# Patient Record
Sex: MALE | Race: WHITE
[De-identification: names, ages, dates, MRNs, and addresses within clinical notes are randomized per-mention and may not be internally consistent; named-entity substitution may affect disease eponyms.]

---

## 2018-04-24 ENCOUNTER — HOSPITAL ENCOUNTER (OUTPATIENT)
Dept: HOSPITAL 47 - RADXRMAIN | Age: 73
Discharge: HOME | End: 2018-04-24
Payer: MEDICARE

## 2018-04-24 DIAGNOSIS — R52: Primary | ICD-10-CM

## 2018-04-24 PROCEDURE — 71046 X-RAY EXAM CHEST 2 VIEWS: CPT

## 2018-04-24 PROCEDURE — 72040 X-RAY EXAM NECK SPINE 2-3 VW: CPT

## 2018-04-24 PROCEDURE — 72100 X-RAY EXAM L-S SPINE 2/3 VWS: CPT

## 2018-04-24 NOTE — XR
EXAMINATION TYPE: XR chest 2V

 

DATE OF EXAM: 4/24/2018

 

COMPARISON: NONE

 

HISTORY: Shortness of breath

 

TECHNIQUE:  Frontal and lateral views of the chest are obtained.

 

FINDINGS:

 

Scattered senescent parenchymal changes noted. Hyperinflation compatible with COPD. 

 

No evidence for infiltrate. No evidence for atelectasis.

 

Heart size is stable.

 

Mediastinal structures are stable and grossly unremarkable.

 

No evidence for hilar prominence.

 

Degenerative changes dorsal spine. 

 

IMPRESSION:

1. No evidence for acute pulmonary disease.

## 2018-04-24 NOTE — XR
EXAMINATION TYPE: XR lumbar spine 2 or 3V

 

DATE OF EXAM: 4/24/2018

 

CLINICAL HISTORY: pain

 

TECHNIQUE: Three views of the lumbar spine are submitted.

 

COMPARISON: None.

 

FINDINGS:  

There are 5 lumbar type vertebral bodies identified.  The lumbar spine shows satisfactory alignment w
ithout evidence of acute fracture or dislocation. Vertebral body heights are within normal limits.   
Moderate to severe degenerative disc space narrowing at L4-5 and L5-S1. Facet joint arthropathy.  The
 overlying soft tissue appears unremarkable.

 

IMPRESSION:  

No acute fracture or dislocation is seen in the lumbar spine.

 

ICD 10 NO FRACTURE, INITIAL EVALUATION

## 2018-04-24 NOTE — XR
EXAMINATION TYPE: XR cervical spine limited

 

DATE OF EXAM: 4/24/2018

 

CLINICAL HISTORY: pain

 

TECHNIQUE: 3 views of the cervical spine are submitted.

 

COMPARISON: None.

 

FINDINGS:  There is satisfactory in alignment without evidence of acute fracture or dislocation.  The
 pre-vertebral soft tissue appears within normal limits. Moderate degenerative narrowing at C5-6 and 
C6-7. Ventral and dorsal spondylosis. The C1-C2 articulation is unremarkable on the open mouth view. 


 

 

IMPRESSION:  No acute fracture or dislocation is seen in the cervical spine.

## 2018-04-25 ENCOUNTER — HOSPITAL ENCOUNTER (OUTPATIENT)
Dept: HOSPITAL 47 - RADUSWWP | Age: 73
Discharge: HOME | End: 2018-04-25
Payer: MEDICARE

## 2018-04-25 DIAGNOSIS — N64.4: Primary | ICD-10-CM

## 2018-04-25 NOTE — USB
Reason for exam: clinical finding.

Indicated problem(s): pain in the left breast.



Physical Findings:

Nurse did not find any significant physical abnormalities on exam.



US Breast LT

Left breast ultrasound includes all four quadrants, the retroareolar region and 

axilla. Finding demonstrates no cystic or solid lesion seen.



These results were verbally communicated with the patient and result sheet given 

to the patient on 4/25/18.





ASSESSMENT: Benign, BI-RAD 2



RECOMMENDATION:

Clinical management of the left breast.

Manage patient on a clinical basis.

## 2019-02-16 ENCOUNTER — HOSPITAL ENCOUNTER (OUTPATIENT)
Dept: HOSPITAL 47 - EC | Age: 74
Setting detail: OBSERVATION
LOS: 3 days | Discharge: HOME | End: 2019-02-19
Payer: MEDICARE

## 2019-02-16 VITALS — BODY MASS INDEX: 29.5 KG/M2

## 2019-02-16 DIAGNOSIS — Z79.899: ICD-10-CM

## 2019-02-16 DIAGNOSIS — I25.110: Primary | ICD-10-CM

## 2019-02-16 DIAGNOSIS — Z95.1: ICD-10-CM

## 2019-02-16 DIAGNOSIS — Z79.4: ICD-10-CM

## 2019-02-16 DIAGNOSIS — R77.8: ICD-10-CM

## 2019-02-16 DIAGNOSIS — E78.5: ICD-10-CM

## 2019-02-16 DIAGNOSIS — Z95.5: ICD-10-CM

## 2019-02-16 DIAGNOSIS — I67.4: ICD-10-CM

## 2019-02-16 DIAGNOSIS — Z79.82: ICD-10-CM

## 2019-02-16 DIAGNOSIS — I10: ICD-10-CM

## 2019-02-16 DIAGNOSIS — E11.9: ICD-10-CM

## 2019-02-16 DIAGNOSIS — Z79.02: ICD-10-CM

## 2019-02-16 DIAGNOSIS — I25.82: ICD-10-CM

## 2019-02-16 DIAGNOSIS — Z87.891: ICD-10-CM

## 2019-02-16 LAB
ALBUMIN SERPL-MCNC: 4 G/DL (ref 3.5–5)
ALP SERPL-CCNC: 115 U/L (ref 38–126)
ALT SERPL-CCNC: 29 U/L (ref 21–72)
ANION GAP SERPL CALC-SCNC: 8 MMOL/L
APTT BLD: 23.9 SEC (ref 22–30)
AST SERPL-CCNC: 26 U/L (ref 17–59)
BASOPHILS # BLD AUTO: 0.1 K/UL (ref 0–0.2)
BASOPHILS NFR BLD AUTO: 1 %
BUN SERPL-SCNC: 21 MG/DL (ref 9–20)
CALCIUM SPEC-MCNC: 9.3 MG/DL (ref 8.4–10.2)
CHLORIDE SERPL-SCNC: 105 MMOL/L (ref 98–107)
CHOLEST SERPL-MCNC: 253 MG/DL (ref ?–200)
CK SERPL-CCNC: 59 U/L (ref 55–170)
CK SERPL-CCNC: 62 U/L (ref 55–170)
CK SERPL-CCNC: 82 U/L (ref 55–170)
CO2 SERPL-SCNC: 27 MMOL/L (ref 22–30)
EOSINOPHIL # BLD AUTO: 0.2 K/UL (ref 0–0.7)
EOSINOPHIL NFR BLD AUTO: 2 %
ERYTHROCYTE [DISTWIDTH] IN BLOOD BY AUTOMATED COUNT: 5.52 M/UL (ref 4.3–5.9)
ERYTHROCYTE [DISTWIDTH] IN BLOOD: 13.8 % (ref 11.5–15.5)
GLUCOSE BLD-MCNC: 120 MG/DL (ref 75–99)
GLUCOSE BLD-MCNC: 125 MG/DL (ref 75–99)
GLUCOSE BLD-MCNC: 360 MG/DL (ref 75–99)
GLUCOSE BLD-MCNC: 432 MG/DL (ref 75–99)
GLUCOSE SERPL-MCNC: 150 MG/DL (ref 74–99)
HCT VFR BLD AUTO: 46.9 % (ref 39–53)
HDLC SERPL-MCNC: 27 MG/DL (ref 40–60)
HGB BLD-MCNC: 15.4 GM/DL (ref 13–17.5)
INR PPP: 0.9 (ref ?–1.2)
LYMPHOCYTES # SPEC AUTO: 1.8 K/UL (ref 1–4.8)
LYMPHOCYTES NFR SPEC AUTO: 16 %
MAGNESIUM SPEC-SCNC: 2.1 MG/DL (ref 1.6–2.3)
MCH RBC QN AUTO: 27.8 PG (ref 25–35)
MCHC RBC AUTO-ENTMCNC: 32.7 G/DL (ref 31–37)
MCV RBC AUTO: 85 FL (ref 80–100)
MONOCYTES # BLD AUTO: 0.8 K/UL (ref 0–1)
MONOCYTES NFR BLD AUTO: 7 %
NEUTROPHILS # BLD AUTO: 8.1 K/UL (ref 1.3–7.7)
NEUTROPHILS NFR BLD AUTO: 72 %
PLATELET # BLD AUTO: 288 K/UL (ref 150–450)
POTASSIUM SERPL-SCNC: 3.7 MMOL/L (ref 3.5–5.1)
PROT SERPL-MCNC: 7.1 G/DL (ref 6.3–8.2)
PT BLD: 9.5 SEC (ref 9–12)
SODIUM SERPL-SCNC: 140 MMOL/L (ref 137–145)
TRIGL SERPL-MCNC: 445 MG/DL (ref ?–150)
TROPONIN I SERPL-MCNC: 0.02 NG/ML (ref 0–0.03)
TROPONIN I SERPL-MCNC: 0.03 NG/ML (ref 0–0.03)
TROPONIN I SERPL-MCNC: 0.04 NG/ML (ref 0–0.03)
WBC # BLD AUTO: 11.3 K/UL (ref 3.8–10.6)

## 2019-02-16 PROCEDURE — 75710 ARTERY X-RAYS ARM/LEG: CPT

## 2019-02-16 PROCEDURE — 99285 EMERGENCY DEPT VISIT HI MDM: CPT

## 2019-02-16 PROCEDURE — 93005 ELECTROCARDIOGRAM TRACING: CPT

## 2019-02-16 PROCEDURE — 83880 ASSAY OF NATRIURETIC PEPTIDE: CPT

## 2019-02-16 PROCEDURE — 82553 CREATINE MB FRACTION: CPT

## 2019-02-16 PROCEDURE — 94760 N-INVAS EAR/PLS OXIMETRY 1: CPT

## 2019-02-16 PROCEDURE — 85730 THROMBOPLASTIN TIME PARTIAL: CPT

## 2019-02-16 PROCEDURE — 80053 COMPREHEN METABOLIC PANEL: CPT

## 2019-02-16 PROCEDURE — 85610 PROTHROMBIN TIME: CPT

## 2019-02-16 PROCEDURE — 80061 LIPID PANEL: CPT

## 2019-02-16 PROCEDURE — 93459 L HRT ART/GRFT ANGIO: CPT

## 2019-02-16 PROCEDURE — 82550 ASSAY OF CK (CPK): CPT

## 2019-02-16 PROCEDURE — 83735 ASSAY OF MAGNESIUM: CPT

## 2019-02-16 PROCEDURE — 85379 FIBRIN DEGRADATION QUANT: CPT

## 2019-02-16 PROCEDURE — 84484 ASSAY OF TROPONIN QUANT: CPT

## 2019-02-16 PROCEDURE — 93306 TTE W/DOPPLER COMPLETE: CPT

## 2019-02-16 PROCEDURE — 85025 COMPLETE CBC W/AUTO DIFF WBC: CPT

## 2019-02-16 RX ADMIN — INSULIN ASPART SCH UNIT: 100 INJECTION, SOLUTION INTRAVENOUS; SUBCUTANEOUS at 21:07

## 2019-02-16 RX ADMIN — INSULIN ASPART SCH: 100 INJECTION, SOLUTION INTRAVENOUS; SUBCUTANEOUS at 12:21

## 2019-02-16 RX ADMIN — CARVEDILOL SCH MG: 12.5 TABLET, FILM COATED ORAL at 11:53

## 2019-02-16 RX ADMIN — INSULIN ASPART SCH UNIT: 100 INJECTION, SOLUTION INTRAVENOUS; SUBCUTANEOUS at 16:38

## 2019-02-16 RX ADMIN — INSULIN ASPART SCH: 100 INJECTION, SOLUTION INTRAVENOUS; SUBCUTANEOUS at 11:47

## 2019-02-16 RX ADMIN — CLOPIDOGREL BISULFATE SCH MG: 75 TABLET ORAL at 11:53

## 2019-02-16 RX ADMIN — HYDROCHLOROTHIAZIDE SCH MG: 12.5 CAPSULE ORAL at 12:20

## 2019-02-16 RX ADMIN — EZETIMIBE SCH MG: 10 TABLET ORAL at 12:20

## 2019-02-16 RX ADMIN — LOSARTAN POTASSIUM SCH MG: 50 TABLET, FILM COATED ORAL at 11:53

## 2019-02-16 RX ADMIN — CARVEDILOL SCH MG: 12.5 TABLET, FILM COATED ORAL at 16:38

## 2019-02-16 RX ADMIN — ATORVASTATIN CALCIUM SCH MG: 20 TABLET, FILM COATED ORAL at 11:53

## 2019-02-16 NOTE — ECHOF
Referral Reason:cp



MEASUREMENTS

--------

HEIGHT: 175.3 cm

WEIGHT: 90.7 kg

BP: 149/64

IVSd:   1.2 cm     (0.6 - 1.1)

LVIDd:   5.3 cm     (3.9 - 5.3)

LVPWd:   1.1 cm     (0.6 - 1.1)

EDV(Teich):   135 ml

IVSs:   1.5 cm

LVIDs:   3.5 cm

LVPWs:   1.5 cm

ESV(Teich):   50 ml

EF(Teich):   63 %

%FS:   35 %

SV(Teich):   86 ml

RVIDd:   3.3 cm     (< 3.3)

LALs A4C:   6.3 cm

LAAs A4C:   21.1 cm

LAESV A-L A4C:   60 ml

LAESV MOD A4C:   59 ml

LALs A2C:   5.3 cm

LAAs A2C:   20.0 cm

LAESV A-L A2C:   64 ml

LAESV MOD A2C:   63 ml

LAESV(A-L):   68 ml

LAESV Index (A-L):   32.85 ml/m

Ao Diam:   3.8 cm     (2.0 - 3.7)

LA Diam:   3.4 cm     (2.7 - 3.8)

AV Cusp:   1.7 cm     (1.5 - 2.6)

EPSS:   0.4 cm

MV E Eyad:   0.50 m/s

MV DecT:   410 ms

MV Dec Hardee:   1.2 m/s

MV A Eyad:   0.64 m/s

MV E/A Ratio:   0.79 

AV Vmax:   1.29 m/s

AV maxP.67 mmHg

TR Vmax:   2.07 m/s

TR maxP.17 mmHg

RAP:   5.00 mmHg

RVSP:   22.17 mmHg

MV EF SLOPE:   115.99 mm/s     (70 - 150)

MV EXCURSION:   1.99 cm     (> 18.000)







FINDINGS

--------

Sinus rhythm.

This was a technically adequate study.

The left ventricular size is normal.   There is mild concentric left ventricular hypertrophy.   Overa
ll left ventricular systolic function is normal with, an EF between 55 - 60 %.

The right ventricle is normal in size.

LA is midly dilated 29-33ml/m2.

The right atrium is normal in size.

Aortic valve is trileaflet and is mildly thickened.   There is no evidence of aortic regurgitation.  
 There is no evidence of aortic stenosis.

The mitral valve leaflets are mildly thickened.   There is trace to mild mitral regurgitation.

Trace tricuspid regurgitation present.   Right ventricular systolic pressure is normal at < 35 mmHg. 
  There is no evidence of pulmonary hypertension.

The pulmonic valve was not well visualized.

The aortic root size is normal.

Normal inferior vena cava with normal inspiratory collapse consistent with estimated right atrial pre
ssure of  5 mmHg.

There is no pericardial effusion.



CONCLUSIONS

--------

1. Sinus rhythm.

2. This was a technically adequate study.

3. The left ventricular size is normal.

4. There is mild concentric left ventricular hypertrophy.

5. Overall left ventricular systolic function is normal with, an EF between 55 - 60 %.

6. The right ventricle is normal in size.

7. LA is midly dilated 29-33ml/m2.

8. Aortic valve is trileaflet and is mildly thickened.

9. The mitral valve leaflets are mildly thickened.

10. There is trace to mild mitral regurgitation.

11. Trace tricuspid regurgitation present.

12. Right ventricular systolic pressure is normal at < 35 mmHg.

13. There is no evidence of pulmonary hypertension.

14. The pulmonic valve was not well visualized.

15. The aortic root size is normal.

16. There is no pericardial effusion.





SONOGRAPHER: Atul Mcrae RDCS

## 2019-02-16 NOTE — ED
Chest Pain HPI





- General


Stated Complaint: Hypertension


Time Seen by Provider: 02/16/19 03:16


Source: patient, EMS


Mode of arrival: EMS


Limitations: no limitations





- History of Present Illness


Initial Comments: 


Abimbola is a pleasant 73 yo male with PMH of CAD and CABG who presents to our ED 

as a transfer via EMS from an outside facility.  Patient reports that earlier 

today he began having a mild headache and feeling some heaviness in his chest E 

noted his blood pressure is elevated at which time he went to the emergency 

department for evaluation.  At the outside facility patient had a head CT with 

no acute findings, was given medications in the blood pressure normalized his 

EKG was nonischemic and his first troponin was negative.  Given the patient's 

significant history it was decided that he should be transferred to our system 

facility for evaluation by cardiology.


Patient reports that his chest heaviness resolved at the outside facility and 

is not been having any heaviness since that time.  He denies any associated 

shortness of breath diaphoresis or lightheadedness.  He does admit to having a 

mild headache earlier in the day but reports that has resolved as well.








- Related Data


 Home Medications











 Medication  Instructions  Recorded  Confirmed


 


Aspirin [Adult Low Dose Aspirin EC] 162 mg PO HS 10/14/16 10/17/16


 


Carvedilol [Coreg] 12.5 mg PO BID 10/14/16 10/14/16


 


Clopidogrel [Plavix] 75 mg PO DAILY 10/14/16 10/17/16


 


Ezetimibe/Simvastatin [Vytorin 1 tab PO QAM 10/14/16 10/14/16





10-40 mg Tablet]   


 


Insulin Glargine [Lantus] 55 unit SQ QAM 10/14/16 10/17/16


 


Niacin [Niacin ER] 2,000 mg PO HS 10/14/16 10/14/16


 


Telmisartan/Hydrochlorothiazid 1 tab PO QAM 10/14/16 10/14/16





[Telmisartan-Hctz 80-12.5 mg Tb]   


 


amLODIPine [Norvasc] 10 mg PO QAM 10/14/16 10/14/16











 Allergies











Allergy/AdvReac Type Severity Reaction Status Date / Time


 


No Known Allergies Allergy   Verified 10/14/16 12:50














Review of Systems


ROS Statement: 


Those systems with pertinent positive or pertinent negative responses have been 

documented in the HPI.





ROS Other: All systems not noted in ROS Statement are negative.





EKG Findings





- EKG Comments:


EKG Findings:: EKG obtained at 3:21 AM, rate is 73 rhythm is sinus there is a 

leftward axis normal intervals, , , QTc 469.  There is an 

incomplete right bundle branch block.  There are no acute ST elevations or 

depressions there is no evidence of acute ischemia or infarction.





Past Medical History


Past Medical History: Diabetes Mellitus, Hyperlipidemia, Hypertension


History of Any Multi-Drug Resistant Organisms: None Reported


Past Surgical History: Coronary Bypass/CABG, Heart Catheterization With Stent


Additional Past Surgical History / Comment(s): CABG, 2004.  CATARACTS.  

BILATERAL EYE SURGERY (DIABETIC)


Past Anesthesia/Blood Transfusion Reactions: No Reported Reaction


Date of Last Stent Placement:: UNK


Past Psychological History: No Psychological Hx Reported


Smoking Status: Former smoker


Past Alcohol Use History: None Reported


Past Drug Use History: None Reported





General Exam





- General Exam Comments


Initial Comments: 


Physical Exam


GENERAL:


Patient is well-developed and well-nourished.  Patient is nontoxic and well-

hydrated and is in no distress.





HENT:


Normocephalic, Atraumatic. 





EYES:


PERRL, EOMI





PULMONARY:


Unlabored respirations.  No audible rales rhonchi or wheezing was noted.





CARDIOVASCULAR:


There is a regular rate and rhythm without any murmurs gallops or rubs.  


Well healed mid sternal incision consistent with history of CABG





ABDOMEN:


Soft and nontender with normal bowel sounds. 





SKIN:


Skin is clear with no lesions or rashes and otherwise unremarkable.





: 


Deferred





NEUROLOGIC:


Patient is alert and oriented x3.  Moving all extremities spontaneously





MUSCULOSKELETAL:


Normal extremities with adequate strength and full range of motion.  No lower 

extremity swelling or edema.  No calf tenderness.  





PSYCHIATRIC:


Normal psychiatric evaluation.  





Limitations: no limitations





Limitations: no limitations





Course


 Vital Signs











  02/16/19 02/16/19 02/16/19





  03:16 03:18 03:53


 


Temperature 98.1 F  


 


Pulse Rate 75  76


 


Pulse Rate [  71 





Cardiac Monitor   





]   


 


Respiratory 18  18





Rate   


 


Blood Pressure 145/88  152/87


 


O2 Sat by Pulse 97  97





Oximetry   














Chest Pain MDM





- [facility]


Patient care was discussed with transferring physician patient had a thorough 

cardiac workup at outside facility as well as a head CT


Upon arrival patient is asymptomatic, blood pressure is elevated but not 

critically so


Repeat labs were ordered





Patient's PCP was paged for admission


Patient admitted with consult to cardiology








Disposition


Clinical Impression: 


 Chest pain





Disposition: ADMITTED AS IP TO THIS HOSP


Condition: Stable


Is patient prescribed a controlled substance at d/c from ED?: No

## 2019-02-16 NOTE — CONS
CONSULTATION



Mr. Downey is a 74-year-old gentleman who is seen for the cardiac evaluation.  The

patient's electronic medical records as well as the old charts are reviewed.  The

patient has a known history of coronary artery disease with coronary artery bypass

surgery.  He had a cardiac catheterization done in 2016 which showed a patent graft.

The patient has not been followed with Dr. May regularly.  This patient gives a

history that he started having a headache and then he had heaviness in the chest.  He

went to [facility] where his blood pressure was in the range of 200.  He was

given medications and the blood pressure stabilized.  EKG did not show any acute

ischemic changes, but because of his history, patient was transferred over here.  He is

feeling fairly well.  The patient was moderately active physically and he is not having

any exertional chest discomfort, usually.



PAST MEDICAL HISTORY:

Past medical history includes a history of coronary artery bypass surgery in 2004,

cardiac catheterization done in 2016.  The patient has a history of hypertension,

hyperlipidemia, and diabetes.



MEDICATIONS:

Home medications include amlodipine 10 mg daily.  Micardis, niacin, insulin, Vytorin,

Coreg and Plavix and baby aspirin.



PHYSICAL EXAMINATION:

Physical examination in the emergency room here, patient's blood pressure was 152/87

mmHg, oxygen saturation was 97%.  HEENT examination is negative.  Neck is supple.

There is no increase in jugular venous pressure.  Both the carotid pulses are felt.

There is no bruit.  Chest is symmetrical.  Heart the PMI is not felt.  First and second

heart sounds are normal.  There is no evidence of any murmur.  Lungs are clinically

clear to auscultation and percussion.  Abdomen is soft.  Liver and spleen are not

enlarged.  Bowel sounds are heard.  Extremities:  Peripheral pulses are 2+.



LABORATORY DATA:

EKG shows right bundle branch block with left anterior hemiblock versus old inferior

wall myocardial infarction.  The patient's initial troponin at the [facility]

was normal.  First troponin done here is 0.029.  The patient's creatinine is 1.10.



FINAL IMPRESSION:

Hypertension and headache, rule out non ST-segment elevation myocardial infarction.

Patient's 1st troponin done here is abnormal.  We will add and do serial  troponin, if

the troponins are suggestive of non ST-segment elevation myocardial infarction, patient

will need further evaluation with a cardiac catheterization



Thank you for the consultation.





MMODL / IJN: [id number] / Job#: [id number]

## 2019-02-17 LAB
GLUCOSE BLD-MCNC: 252 MG/DL (ref 75–99)
GLUCOSE BLD-MCNC: 254 MG/DL (ref 75–99)
GLUCOSE BLD-MCNC: 303 MG/DL (ref 75–99)
GLUCOSE BLD-MCNC: 504 MG/DL (ref 75–99)

## 2019-02-17 RX ADMIN — ASPIRIN 325 MG ORAL TABLET SCH MG: 325 PILL ORAL at 08:29

## 2019-02-17 RX ADMIN — INSULIN ASPART SCH UNIT: 100 INJECTION, SOLUTION INTRAVENOUS; SUBCUTANEOUS at 06:23

## 2019-02-17 RX ADMIN — EZETIMIBE SCH MG: 10 TABLET ORAL at 08:29

## 2019-02-17 RX ADMIN — CARVEDILOL SCH MG: 12.5 TABLET, FILM COATED ORAL at 06:23

## 2019-02-17 RX ADMIN — INSULIN ASPART SCH UNIT: 100 INJECTION, SOLUTION INTRAVENOUS; SUBCUTANEOUS at 16:58

## 2019-02-17 RX ADMIN — INSULIN DETEMIR SCH UNIT: 100 INJECTION, SOLUTION SUBCUTANEOUS at 11:49

## 2019-02-17 RX ADMIN — CLOPIDOGREL BISULFATE SCH MG: 75 TABLET ORAL at 08:29

## 2019-02-17 RX ADMIN — INSULIN ASPART SCH: 100 INJECTION, SOLUTION INTRAVENOUS; SUBCUTANEOUS at 11:16

## 2019-02-17 RX ADMIN — LOSARTAN POTASSIUM SCH MG: 50 TABLET, FILM COATED ORAL at 10:09

## 2019-02-17 RX ADMIN — HYDROCHLOROTHIAZIDE SCH MG: 12.5 CAPSULE ORAL at 10:09

## 2019-02-17 RX ADMIN — CARVEDILOL SCH MG: 12.5 TABLET, FILM COATED ORAL at 16:58

## 2019-02-17 RX ADMIN — INSULIN ASPART SCH UNIT: 100 INJECTION, SOLUTION INTRAVENOUS; SUBCUTANEOUS at 20:16

## 2019-02-17 RX ADMIN — ATORVASTATIN CALCIUM SCH MG: 20 TABLET, FILM COATED ORAL at 08:29

## 2019-02-17 NOTE — PN
PROGRESS NOTE



SUBJECTIVE:

This is a white male who came in with elevated troponin and chest pain.  He is much

better.  His blood pressure is under better control.  Sugar is up to 500 today. He has

not been on his insulin in a few days. One out of three troponins was high. Dr. May

apparently is going to do a heart catheterization on him as he has a history of heart

disease. Diabetes will continue to be controlled with current medicines.



CARDIOVASCULAR: S1, S2.

LUNGS: Transmitted upper sounds.

GI: Soft.

HEMATOLOGY: Negative Homans.



_____45, cholesterol 253, HDL 27.



ASSESSMENT:

1. Unstable angina.

2. Elevated troponin.

3. Hypertension acceleration, doubt myocardial infarction after normal echo.

4. Diabetes.

5. Dyslipidemia.

Cardiac catheterization and possible discharge home. Insulin and blood pressure

medicines have been ordered.





MMODL / IJN: [id number] / Job#: [id number]

## 2019-02-17 NOTE — HP
HISTORY AND PHYSICAL



CHIEF COMPLAINT:

74-year-old white male came to the hospital with some chest pain.



HISTORY OF PRESENT ILLNESS:

74-year-old white male who stopped taking blood pressure medicines 3 days ago for

unclear reasons, possibly that he is depressed per nursing.  Anyway he came to hospital

due to chest pain and heaviness in his chest.  He was found to have a second troponin

elevated.  Wait for Cardiology and echo reports.  Chest x-ray is negative.



PAST MEDICAL HISTORY:

Hypertension acceleration has been treated with appropriate medications.



MEDICATIONS:

Home medicines.  Please see orders.



PAST SURGICAL HISTORY:

Surgical history see old chart.



ALLERGIES:

Allergies see old chart.



REVIEW OF SYSTEMS:

Fourteen point review of systems negative except for mentioned in HPI.



PHYSICAL EXAMINATION:

VITAL SIGNS:  Reviewed.

Cardiovascular S1, S2.

LUNGS:  Clear.  GI soft.  Hematology negative Homans.  Psych fair mood and affect.

NEUROLOGIC:  Alert and orient x3. Ophthalmological pupils equal, round, react to light

and accommodation.



ASSESSMENT:

1. Elevated troponin.

2. Chest tightness.

3. Suspect angina.

4. Hypertension acceleration.

5. Possible depression.

6. Rule out myocardial infarction.

Continue current treatment.  Cardiology consult.  Echo.  Hypertension control.





MMODL / IJN: [id number] / Job#: [id number]

## 2019-02-17 NOTE — P.PN
Subjective


Progress Note Date: 02/17/19


This is a 74-year-old gentleman seen in consultation yesterday by Dr. VC Gramajo.  He had a cardiac catheterization performed in 2016 which revealed a 

patent graft, he gives history that he's been having intermittent chest 

heaviness.  His EKG did not reveal any ischemic change but because of his 

history he was transferred here.  Patient does have history of hypertension, 

hyperlipidemia, diabetes.  He was noted to have mild abnormality in his 

troponins.  For this reason patient was advised to undergo cardiac 

catheterization, the risks and benefits were explained to the patient and his 

wife in detail, this will be performed tomorrow by Dr. Bird.  Blood pressure 

128/60 with a heart rate in the 50s, 97% on room air.








Objective





- Vital Signs


Vital signs: 


 Vital Signs











Temp  97.1 F L  02/17/19 08:00


 


Pulse  56 L  02/17/19 12:00


 


Resp  18   02/17/19 12:00


 


BP  129/69   02/17/19 12:00


 


Pulse Ox  97   02/17/19 12:00








 Intake & Output











 02/16/19 02/17/19 02/17/19





 18:59 06:59 18:59


 


Intake Total 240  480


 


Balance 240  480


 


Intake:   


 


  Oral 240  480


 


Other:   


 


  Voiding Method Toilet Toilet 





  Urinal 


 


  # Voids  1 














- Exam





PHYSICAL EXAMINATION: 





GENERAL: 74-year-old  gentleman in no acute distress at the time of my 

examination





HEENT: Head is atraumatic, normocephalic.  Pupils equal, round.  Sclera 

anicteric. Conjunctiva are clear.  Mucous membranes of the mouth are moist.  

Neck is supple.  There is no elevated jugular venous pressure.]  bruit is heard.





HEART EXAMINATION: Heart S1, S2 normal.  No murmur or gallop heard.





CHEST EXAMINATION: Lungs are clear to auscultation and precussion. No chest 

wall tenderness is noted on palpation or with deep breathing.





ABDOMEN:  Soft, nontender. Bowel sounds are heard. No organomegaly noted.


 


EXTREMITIES: 2+ peripheral pulses with no evidence of peripheral edema and no 

calf tenderness noted.





NEUROLOGIC patient is awake, alert and oriented 3 .


 


.


 








- Labs


CBC & Chem 7: 


 02/16/19 03:30





 02/16/19 03:30


Labs: 


 Abnormal Lab Results - Last 24 Hours (Table)











  02/16/19 02/16/19 02/16/19 Range/Units





  03:30 15:59 16:14 


 


D-Dimer   0.76 H   (<0.60)  mg/L FEU


 


POC Glucose (mg/dL)    432 H  (75-99)  mg/dL


 


Triglycerides  445 H    (<150)  mg/dL


 


Cholesterol  253 H    (<200)  mg/dL


 


HDL Cholesterol  27 L    (40-60)  mg/dL














  02/16/19 02/17/19 02/17/19 Range/Units





  20:27 05:51 11:09 


 


D-Dimer     (<0.60)  mg/L FEU


 


POC Glucose (mg/dL)  360 H  303 H  504 H  (75-99)  mg/dL


 


Triglycerides     (<150)  mg/dL


 


Cholesterol     (<200)  mg/dL


 


HDL Cholesterol     (40-60)  mg/dL














Assessment and Plan


Plan: 





Assessment and plan


#1 hypertension


#2 possible non-Q-wave myocardial infarction.  Patient will be scheduled to 

undergo cardiac catheterization tomorrow with Dr. Bird.


#3 known history of coronary artery disease with prior bypass surgery


#4 hypertension


#5 diabetes


#6 hyperlipidemia





Plan


Cardiac catheterization tomorrow with Dr. Bird.  Further recommendations will 

be based on these findings and patient's clinical course.





DNP note has been reviewed, I agree with a documented findings and plan of 

care.  Patient was seen and examined.

## 2019-02-18 LAB
GLUCOSE BLD-MCNC: 236 MG/DL (ref 75–99)
GLUCOSE BLD-MCNC: 237 MG/DL (ref 75–99)
GLUCOSE BLD-MCNC: 310 MG/DL (ref 75–99)
GLUCOSE BLD-MCNC: 393 MG/DL (ref 75–99)

## 2019-02-18 PROCEDURE — B2151ZZ FLUOROSCOPY OF LEFT HEART USING LOW OSMOLAR CONTRAST: ICD-10-PCS

## 2019-02-18 PROCEDURE — B41C1ZZ FLUOROSCOPY OF PELVIC ARTERIES USING LOW OSMOLAR CONTRAST: ICD-10-PCS

## 2019-02-18 PROCEDURE — B2111ZZ FLUOROSCOPY OF MULTIPLE CORONARY ARTERIES USING LOW OSMOLAR CONTRAST: ICD-10-PCS

## 2019-02-18 PROCEDURE — 4A023N7 MEASUREMENT OF CARDIAC SAMPLING AND PRESSURE, LEFT HEART, PERCUTANEOUS APPROACH: ICD-10-PCS

## 2019-02-18 PROCEDURE — B2131ZZ FLUOROSCOPY OF MULTIPLE CORONARY ARTERY BYPASS GRAFTS USING LOW OSMOLAR CONTRAST: ICD-10-PCS

## 2019-02-18 RX ADMIN — CARVEDILOL SCH MG: 12.5 TABLET, FILM COATED ORAL at 06:35

## 2019-02-18 RX ADMIN — CARVEDILOL SCH MG: 12.5 TABLET, FILM COATED ORAL at 17:34

## 2019-02-18 RX ADMIN — INSULIN ASPART SCH UNIT: 100 INJECTION, SOLUTION INTRAVENOUS; SUBCUTANEOUS at 12:09

## 2019-02-18 RX ADMIN — LOSARTAN POTASSIUM SCH MG: 50 TABLET, FILM COATED ORAL at 06:36

## 2019-02-18 RX ADMIN — INSULIN DETEMIR SCH UNIT: 100 INJECTION, SOLUTION SUBCUTANEOUS at 09:23

## 2019-02-18 RX ADMIN — ASPIRIN 325 MG ORAL TABLET SCH: 325 PILL ORAL at 05:22

## 2019-02-18 RX ADMIN — CLOPIDOGREL BISULFATE SCH MG: 75 TABLET ORAL at 06:36

## 2019-02-18 RX ADMIN — HYDROCHLOROTHIAZIDE SCH MG: 12.5 CAPSULE ORAL at 09:23

## 2019-02-18 RX ADMIN — EZETIMIBE SCH MG: 10 TABLET ORAL at 06:36

## 2019-02-18 RX ADMIN — INSULIN ASPART SCH UNIT: 100 INJECTION, SOLUTION INTRAVENOUS; SUBCUTANEOUS at 21:09

## 2019-02-18 RX ADMIN — INSULIN ASPART SCH UNIT: 100 INJECTION, SOLUTION INTRAVENOUS; SUBCUTANEOUS at 06:35

## 2019-02-18 RX ADMIN — ATORVASTATIN CALCIUM SCH: 20 TABLET, FILM COATED ORAL at 05:22

## 2019-02-18 RX ADMIN — INSULIN ASPART SCH UNIT: 100 INJECTION, SOLUTION INTRAVENOUS; SUBCUTANEOUS at 17:25

## 2019-02-18 NOTE — CC
CARDIAC CATHETERIZATION REPORT



DATE OF SERVICE:

February 18, 2019



PERFORMING PHYSICIAN:

Selvin May MD, interventional cardiologist.



PROCEDURE PERFORMED:

1. Selective left and right coronary angiogram.

2. LIMA to LAD angiogram.

3. SVG to diagonal angiogram.

4. SVG to OM angiogram.

5. SVG to RCA angiogram.

6. Left heart catheterization.

7. Selective right common iliac artery angiogram.

8. Gradient measurement across the right common iliac artery.



INDICATION:

This is a pleasant 74-year-old gentleman who has known history of coronary artery

disease with the last heart catheterization in 2016 showing severe triple-vessel

coronary artery disease with patent LIMA to LAD, patent SVG to diagonal, patent SVG to

OM, and patent SVG to RCA.  He presented to the hospital complaining of chest

discomfort and was seen by Dr. Gramajo who recommended proceeding with a heart

catheterization giving his symptoms of chest discomfort, which was quite concerning for

angina.



APPROACH:

Right common femoral artery.



COMPLICATION:

None.



LEVEL OF SEDATION:

Moderate with sedation length of 28 minutes.



PROCEDURE DESCRIPTION:

After obtaining an informed consent, the patient was brought to the cardiac cath lab.

The right common femoral artery was cannulated using micropuncture technique, the

micropuncture wire passed easily then I placed a 6-Surinamese sheath 11 cm in the right

common iliac artery.  I did have hard time advancing J-wire across the right common

iliac artery by its ostium and I was able to cross it using a Glidewire 0.035.  After

that, I did exchange my 11 cm 6-Surinamese sheath into 23 cm 6-Surinamese sheath using 0.035

Glidewire because I had a hard time advancing JL4 across the ostial of the right common

iliac artery.



After that I did selective left and right coronary angiogram using JL4 and JR4

catheters.  LIMA to LAD angiogram, SVG to diagonal angiogram, SVG to RCA angiogram, and

SVG to OM angiogram, were performed using the JR4 catheter.  Left heart catheterization

was performed using JR4 catheter which flipped into the LV then I did pullback across

aortic valve.



Finally I did selective right common iliac artery angiogram and gradient measurement

across the right common iliac artery _____by the end of the procedure.



SELECTIVE CORONARY ANGIOGRAM:

1. The left main appeared to be heavily calcified with critical disease distally.  It

    bifurcates into left circumflex and left anterior descending artery.

2. The left circumflex is a large caliber vessel.  It is a nondominant vessel.  The

    left circumflex gives rise into a large OM in the midportion, which seems to be

    subtotally occluded.  The left circumflex continues after that as a small-caliber

    vessel in the AV groove.

3. The LAD is 100% occluded by its ostium.

4. The RCA has a critical disease in the mid and distal portion.



CORONARY BYPASS ANGIOGRAM:

1. The LIMA to LAD is patent.  The LAD distal to the LIMA anastomosis has a lesion

    appeared to be in the range of 50%.

2. The SVG to diagonal is patent.

3. SVG to OM is patent.

4. SVG to RCA is patent.



HEMODYNAMICS:

The left ventricular end-diastolic pressure was around 8 mmHg without significant

gradient across the aortic valve.



SELECTIVE ILIAC ANGIOGRAM:

The selective right iliac angiogram was performed in the BURNETTE projection and using a

power injection.  The ostial of the right common iliac artery has a lesion appeared to

be in the range of 99%.  The gradient was 57 mmHg.



CONCLUSION:

1. Severe triple-vessel coronary artery disease.

2. Patent LIMA to LAD.  The LAD distal to the LIMA anastomosis has 50% disease.

3. Patent SVG to diagonal.

4. Patent SVG to OM.

5. Patent SVG to RCA.

6. 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



POSTPROCEDURE MANAGEMENT:

1. At this point maximize medical treatment.

2. PTA of the right common iliac artery if the patient does have symptoms of

    intermittent claudication.





MMODL / IJN: [id number] / Job#: [id number]

## 2019-02-18 NOTE — LTR
February 18, 2019







Re: Abimbola Downey





Dear Dr. Rg:



Mr. Abimbola Downey underwent a heart catheterization and that revealed severe triple-

vessel coronary artery disease, but patent bypasses.



I did recommend maximized medical treatment and follow up with the patient.



Thank you for allowing us to participate in his care and please do not hesitate to call

if you have any question or concern.



Sincerely,





MD ABDULAZIZ Gonzalez / RADHA: [id number] / Job#: [id number]

## 2019-02-18 NOTE — PN
PROGRESS NOTE



This patient is a white male who was admitted to the hospital for catheterization which

showed normal stents, normal bypass grafts without any significant obstruction to the

patent bypass grafts. There was some question if he had some obstruction in his right

leg. He is cleared for discharge for medical management of his triple-vessel disease

due to patent anastomoses. He possibly has critical disease of the right ostium of the

right common iliac artery. If he gets symptoms as an outpatient, he might get a

thrombectomy of the right leg down the road if he has any intermittent claudication.

Otherwise we will continue to monitor him. Discharge home in the morning.





MMODL / IJN: [id number] / Job#: [id number]

## 2019-02-19 VITALS
TEMPERATURE: 98.2 F | SYSTOLIC BLOOD PRESSURE: 144 MMHG | HEART RATE: 58 BPM | DIASTOLIC BLOOD PRESSURE: 68 MMHG | RESPIRATION RATE: 14 BRPM

## 2019-02-19 LAB — GLUCOSE BLD-MCNC: 95 MG/DL (ref 75–99)

## 2019-02-19 RX ADMIN — LOSARTAN POTASSIUM SCH MG: 50 TABLET, FILM COATED ORAL at 08:05

## 2019-02-19 RX ADMIN — INSULIN DETEMIR SCH UNIT: 100 INJECTION, SOLUTION SUBCUTANEOUS at 08:32

## 2019-02-19 RX ADMIN — CLOPIDOGREL BISULFATE SCH MG: 75 TABLET ORAL at 08:06

## 2019-02-19 RX ADMIN — ASPIRIN 325 MG ORAL TABLET SCH MG: 325 PILL ORAL at 08:05

## 2019-02-19 RX ADMIN — EZETIMIBE SCH MG: 10 TABLET ORAL at 08:05

## 2019-02-19 RX ADMIN — INSULIN ASPART SCH: 100 INJECTION, SOLUTION INTRAVENOUS; SUBCUTANEOUS at 06:31

## 2019-02-19 RX ADMIN — ATORVASTATIN CALCIUM SCH MG: 20 TABLET, FILM COATED ORAL at 08:05

## 2019-02-19 RX ADMIN — CARVEDILOL SCH MG: 12.5 TABLET, FILM COATED ORAL at 06:37

## 2019-02-19 RX ADMIN — HYDROCHLOROTHIAZIDE SCH MG: 12.5 CAPSULE ORAL at 08:06

## 2019-04-05 ENCOUNTER — HOSPITAL ENCOUNTER (OUTPATIENT)
Dept: HOSPITAL 47 - CATHCVL | Age: 74
LOS: 1 days | Discharge: HOME | End: 2019-04-06
Attending: INTERNAL MEDICINE
Payer: MEDICARE

## 2019-04-05 VITALS — BODY MASS INDEX: 26.6 KG/M2

## 2019-04-05 VITALS — RESPIRATION RATE: 18 BRPM

## 2019-04-05 DIAGNOSIS — E11.9: ICD-10-CM

## 2019-04-05 DIAGNOSIS — Z79.82: ICD-10-CM

## 2019-04-05 DIAGNOSIS — I70.0: ICD-10-CM

## 2019-04-05 DIAGNOSIS — Z79.4: ICD-10-CM

## 2019-04-05 DIAGNOSIS — I70.8: Primary | ICD-10-CM

## 2019-04-05 DIAGNOSIS — I08.1: ICD-10-CM

## 2019-04-05 DIAGNOSIS — I73.9: ICD-10-CM

## 2019-04-05 DIAGNOSIS — I10: ICD-10-CM

## 2019-04-05 DIAGNOSIS — Z95.1: ICD-10-CM

## 2019-04-05 DIAGNOSIS — E78.5: ICD-10-CM

## 2019-04-05 DIAGNOSIS — F17.210: ICD-10-CM

## 2019-04-05 DIAGNOSIS — Z79.02: ICD-10-CM

## 2019-04-05 DIAGNOSIS — I45.10: ICD-10-CM

## 2019-04-05 DIAGNOSIS — Z79.899: ICD-10-CM

## 2019-04-05 DIAGNOSIS — I25.10: ICD-10-CM

## 2019-04-05 DIAGNOSIS — I25.2: ICD-10-CM

## 2019-04-05 LAB
ANION GAP SERPL CALC-SCNC: 5 MMOL/L
BASOPHILS # BLD AUTO: 0.1 K/UL (ref 0–0.2)
BASOPHILS NFR BLD AUTO: 0 %
BUN SERPL-SCNC: 15 MG/DL (ref 9–20)
CALCIUM SPEC-MCNC: 9 MG/DL (ref 8.4–10.2)
CHLORIDE SERPL-SCNC: 110 MMOL/L (ref 98–107)
CO2 SERPL-SCNC: 25 MMOL/L (ref 22–30)
EOSINOPHIL # BLD AUTO: 0.2 K/UL (ref 0–0.7)
EOSINOPHIL NFR BLD AUTO: 2 %
ERYTHROCYTE [DISTWIDTH] IN BLOOD BY AUTOMATED COUNT: 4.94 M/UL (ref 4.3–5.9)
ERYTHROCYTE [DISTWIDTH] IN BLOOD: 15.1 % (ref 11.5–15.5)
GLUCOSE BLD-MCNC: 147 MG/DL (ref 75–99)
GLUCOSE BLD-MCNC: 209 MG/DL (ref 75–99)
GLUCOSE BLD-MCNC: 78 MG/DL (ref 75–99)
GLUCOSE SERPL-MCNC: 77 MG/DL (ref 74–99)
HCT VFR BLD AUTO: 40.7 % (ref 39–53)
HGB BLD-MCNC: 13.5 GM/DL (ref 13–17.5)
LYMPHOCYTES # SPEC AUTO: 1.2 K/UL (ref 1–4.8)
LYMPHOCYTES NFR SPEC AUTO: 9 %
MCH RBC QN AUTO: 27.3 PG (ref 25–35)
MCHC RBC AUTO-ENTMCNC: 33.2 G/DL (ref 31–37)
MCV RBC AUTO: 82.2 FL (ref 80–100)
MONOCYTES # BLD AUTO: 1.1 K/UL (ref 0–1)
MONOCYTES NFR BLD AUTO: 8 %
NEUTROPHILS # BLD AUTO: 10.5 K/UL (ref 1.3–7.7)
NEUTROPHILS NFR BLD AUTO: 78 %
PLATELET # BLD AUTO: 276 K/UL (ref 150–450)
POTASSIUM SERPL-SCNC: 4.6 MMOL/L (ref 3.5–5.1)
SODIUM SERPL-SCNC: 140 MMOL/L (ref 137–145)
WBC # BLD AUTO: 13.3 K/UL (ref 3.8–10.6)

## 2019-04-05 PROCEDURE — 85025 COMPLETE CBC W/AUTO DIFF WBC: CPT

## 2019-04-05 PROCEDURE — 75625 CONTRAST EXAM ABDOMINL AORTA: CPT

## 2019-04-05 PROCEDURE — 37221: CPT

## 2019-04-05 PROCEDURE — 80048 BASIC METABOLIC PNL TOTAL CA: CPT

## 2019-04-05 PROCEDURE — 75716 ARTERY X-RAYS ARMS/LEGS: CPT

## 2019-04-05 RX ADMIN — FENTANYL CITRATE ONE MCG: 50 INJECTION, SOLUTION INTRAMUSCULAR; INTRAVENOUS at 09:21

## 2019-04-05 RX ADMIN — FENTANYL CITRATE ONE MCG: 50 INJECTION, SOLUTION INTRAMUSCULAR; INTRAVENOUS at 08:47

## 2019-04-05 RX ADMIN — CARVEDILOL SCH MG: 12.5 TABLET, FILM COATED ORAL at 17:49

## 2019-04-05 NOTE — AN
ANGIOGRAPHY REPORT



DATE OF SERVICE:

04/05/2019



PERFORMING PHYSICIAN:

Selvin May MD, Interventional Cardiologist.



PROCEDURE PERFORMED:

1. Selective right common iliac artery angiogram.

2. Balloon angioplasty of the right common iliac artery using 7 x 30 mm balloon.

3. Successful stenting of the right common iliac artery using a 9 x 30 mm balloon

    expandable stent with an excellent angiographic result and reduction of stenosis

    from 99% to 0%.

4. An abdominal aortogram.

5. Bilateral lower extremities runoff.



INDICATION:

This is a 74-year-old gentleman who was experiencing symptoms of right leg intermittent

claudication.  He underwent a heart catheterization a few weeks ago for acute non ST

elevation myocardial infarction and he was found to have a tight lesion involving the

right iliac artery.  Because of that, he was brought today to undergo a PTA of the

right common iliac artery as well as an abdominal aortogram and bilateral lower

extremities runoff.



APPROACH:

Right common femoral artery.



COMPLICATION:

None



LEVEL OF SEDATION:

Moderate with sedation length of 40 minutes.



PROCEDURE DESCRIPTION:

After obtaining an informed consent, the patient was brought to the cardiac cath lab.

The right common femoral artery was cannulated using micropuncture technique, the

micropuncture wire passed easily, then I placed a 7-I[place]ic 23 cm Brite tip sheath in

the right common iliac artery.  I did selective right common iliac artery angiogram and

then I did an abdominal aortogram and bilateral lower extremities runoff using 5-Icelandic

pigtail catheter which was initially placed at the level of the renal arteries and it

was pulled into above the bifurcation of the aorta to right and left common iliac

arteries.



After that, I did an angioplasty of the right common iliac artery.  Please see a

separate paragraph for that.



SELECTIVE PERIPHERAL ANGIOGRAM:

1. The abdominal aorta appeared to have mild disease only and seems to be calcified.

2. COMMON ILIAC ARTERY:  The right common iliac artery is calcified with a critical

    lesion.  The left common iliac artery appeared to be angiographically normal.

3. INTERNAL ILIAC ARTERIES:  The right and left internal iliac arteries are patent.

4. EXTERNAL ILIAC ARTERIES:  The right and left externals are angiographically normal.

5. COMMON FEMORAL ARTERIES:  The right and left common femoral arteries are

    angiographically normal.

6. PROFUNDA:  The right and left profunda are patent.

7. SFA:  The right and left SFA are patent.

8. POPLITEAL:  The right and left popliteal are patent.

9. BELOW THE KNEE:  There are 3 vessel runoff below the knee bilaterally.



PTA OF THE RIGHT ILIAC ARTERY:

Anticoagulation was initiated using heparin and the patient was given 10,000 units of

heparin IV.  Subsequently, I did cross the critical lesion in the right common iliac

artery using a 0.35 [place] [employer] wire.  I did balloon angioplasty using 7 mm balloon

before I deployed 9 x 27 mm balloon expandable stent where the stent was positioned

under fluoroscopy guidance and deployed under 12 atmospheres for 20 seconds with the

following angiogram showing excellent angiographic results and reduction of stenosis

from 99% to 0%.



CONCLUSION:

1. Successful stenting critical lesion in the right common iliac artery with an

    excellent angiographic results and reduction of stenosis from 99% to 0% using a

    balloon expandable stent.

2. No residual disease was identified in the lower extremities.



POSTPROCEDURE MANAGEMENT:

1. Dual anti-platelet therapy.

2. Risk factors modifications.

3. Follow up with the patient.





MMODL / IJN: [id number] / Job#: [id number]

## 2019-04-05 NOTE — LTR
April 5, 2019







Re: Abimbola Downey





Dear Dr. Rg:



Mr. Abimbola Downey underwent successful balloon angioplasty and stenting of the right

common iliac artery with good angiographic results.



I want to thank you for allowing me to participate in his care and please do not

hesitate to call if you have any question or concern.



Sincerely,







MD ABDULAZIZ Gonzalez / RADHA: [id number] / Job#: [id number]

## 2019-04-05 NOTE — IR
EXAMINATION TYPE: IR pta femoral popliteal

 

DATE OF EXAM: 4/5/2019

 

COMPARISON: NONE

 

HISTORY: Fluoroscopy time.

 

Fluoroscopy was provided to the referring clinician.

## 2019-04-06 VITALS — SYSTOLIC BLOOD PRESSURE: 142 MMHG | TEMPERATURE: 98.5 F | HEART RATE: 63 BPM | DIASTOLIC BLOOD PRESSURE: 65 MMHG

## 2019-04-06 LAB
ANION GAP SERPL CALC-SCNC: 5 MMOL/L
BASOPHILS # BLD AUTO: 0.1 K/UL (ref 0–0.2)
BASOPHILS NFR BLD AUTO: 1 %
BUN SERPL-SCNC: 20 MG/DL (ref 9–20)
CALCIUM SPEC-MCNC: 8.8 MG/DL (ref 8.4–10.2)
CHLORIDE SERPL-SCNC: 105 MMOL/L (ref 98–107)
CO2 SERPL-SCNC: 25 MMOL/L (ref 22–30)
EOSINOPHIL # BLD AUTO: 0.2 K/UL (ref 0–0.7)
EOSINOPHIL NFR BLD AUTO: 2 %
ERYTHROCYTE [DISTWIDTH] IN BLOOD BY AUTOMATED COUNT: 4.52 M/UL (ref 4.3–5.9)
ERYTHROCYTE [DISTWIDTH] IN BLOOD: 14.8 % (ref 11.5–15.5)
GLUCOSE BLD-MCNC: 217 MG/DL (ref 75–99)
GLUCOSE SERPL-MCNC: 211 MG/DL (ref 74–99)
HCT VFR BLD AUTO: 38.3 % (ref 39–53)
HGB BLD-MCNC: 12.6 GM/DL (ref 13–17.5)
LYMPHOCYTES # SPEC AUTO: 1.2 K/UL (ref 1–4.8)
LYMPHOCYTES NFR SPEC AUTO: 12 %
MCH RBC QN AUTO: 27.9 PG (ref 25–35)
MCHC RBC AUTO-ENTMCNC: 33 G/DL (ref 31–37)
MCV RBC AUTO: 84.7 FL (ref 80–100)
MONOCYTES # BLD AUTO: 1 K/UL (ref 0–1)
MONOCYTES NFR BLD AUTO: 9 %
NEUTROPHILS # BLD AUTO: 7.5 K/UL (ref 1.3–7.7)
NEUTROPHILS NFR BLD AUTO: 73 %
PLATELET # BLD AUTO: 252 K/UL (ref 150–450)
POTASSIUM SERPL-SCNC: 5.1 MMOL/L (ref 3.5–5.1)
SODIUM SERPL-SCNC: 135 MMOL/L (ref 137–145)
WBC # BLD AUTO: 10.3 K/UL (ref 3.8–10.6)

## 2019-04-06 RX ADMIN — CARVEDILOL SCH MG: 12.5 TABLET, FILM COATED ORAL at 08:38

## 2019-04-06 NOTE — DS
DISCHARGE SUMMARY



ADMISSION DATE:

04/05/2019



DISCHARGE DATE:

04/06/2019



BRIEF HISTORY:

This is a 74-year-old gentleman who was admitted to the hospital yesterday and

underwent successful balloon angioplasty and stenting of the right common iliac artery

using balloon expandable stent with an excellent angiographic results and without any

complication.  The procedure was performed from the right groin.



The right groin is soft and nontender and without any bruises.



The patient is going to be discharged home on dual anti-platelet therapy along with a

statin and I will follow up with the patient in the office in a week.





MMYANETHL / IJN: [id number] / Job#: [id number]

## 2022-01-20 ENCOUNTER — HOSPITAL ENCOUNTER (INPATIENT)
Dept: HOSPITAL 47 - EC | Age: 77
LOS: 4 days | Discharge: NURSING, REHAB OR LONG-TERM CARE | DRG: 177 | End: 2022-01-24
Attending: HOSPITALIST | Admitting: HOSPITALIST
Payer: MEDICARE

## 2022-01-20 VITALS — BODY MASS INDEX: 24.4 KG/M2

## 2022-01-20 DIAGNOSIS — U07.1: Primary | ICD-10-CM

## 2022-01-20 DIAGNOSIS — J96.01: ICD-10-CM

## 2022-01-20 DIAGNOSIS — E11.40: ICD-10-CM

## 2022-01-20 DIAGNOSIS — I25.10: ICD-10-CM

## 2022-01-20 DIAGNOSIS — Z95.820: ICD-10-CM

## 2022-01-20 DIAGNOSIS — F32.A: ICD-10-CM

## 2022-01-20 DIAGNOSIS — Z98.41: ICD-10-CM

## 2022-01-20 DIAGNOSIS — E86.0: ICD-10-CM

## 2022-01-20 DIAGNOSIS — Z79.82: ICD-10-CM

## 2022-01-20 DIAGNOSIS — Z79.02: ICD-10-CM

## 2022-01-20 DIAGNOSIS — K57.90: ICD-10-CM

## 2022-01-20 DIAGNOSIS — I47.2: ICD-10-CM

## 2022-01-20 DIAGNOSIS — I10: ICD-10-CM

## 2022-01-20 DIAGNOSIS — E11.51: ICD-10-CM

## 2022-01-20 DIAGNOSIS — Z79.899: ICD-10-CM

## 2022-01-20 DIAGNOSIS — Z83.3: ICD-10-CM

## 2022-01-20 DIAGNOSIS — Z87.891: ICD-10-CM

## 2022-01-20 DIAGNOSIS — Z95.1: ICD-10-CM

## 2022-01-20 DIAGNOSIS — Z79.4: ICD-10-CM

## 2022-01-20 DIAGNOSIS — M19.90: ICD-10-CM

## 2022-01-20 DIAGNOSIS — Z82.3: ICD-10-CM

## 2022-01-20 DIAGNOSIS — E78.5: ICD-10-CM

## 2022-01-20 DIAGNOSIS — Z95.5: ICD-10-CM

## 2022-01-20 DIAGNOSIS — F03.90: ICD-10-CM

## 2022-01-20 DIAGNOSIS — J12.82: ICD-10-CM

## 2022-01-20 DIAGNOSIS — N17.9: ICD-10-CM

## 2022-01-20 DIAGNOSIS — Z82.49: ICD-10-CM

## 2022-01-20 LAB
ALBUMIN SERPL-MCNC: 3 G/DL (ref 3.5–5)
ALP SERPL-CCNC: 122 U/L (ref 38–126)
ALT SERPL-CCNC: 29 U/L (ref 4–49)
ANION GAP SERPL CALC-SCNC: 10 MMOL/L
AST SERPL-CCNC: 40 U/L (ref 17–59)
BASOPHILS # BLD AUTO: 0 K/UL (ref 0–0.2)
BASOPHILS NFR BLD AUTO: 0 %
BUN SERPL-SCNC: 34 MG/DL (ref 9–20)
CALCIUM SPEC-MCNC: 7.9 MG/DL (ref 8.4–10.2)
CHLORIDE SERPL-SCNC: 103 MMOL/L (ref 98–107)
CO2 SERPL-SCNC: 20 MMOL/L (ref 22–30)
EOSINOPHIL # BLD AUTO: 0 K/UL (ref 0–0.7)
EOSINOPHIL NFR BLD AUTO: 0 %
ERYTHROCYTE [DISTWIDTH] IN BLOOD BY AUTOMATED COUNT: 4.57 M/UL (ref 4.3–5.9)
ERYTHROCYTE [DISTWIDTH] IN BLOOD: 14 % (ref 11.5–15.5)
GLUCOSE SERPL-MCNC: 281 MG/DL (ref 74–99)
HCT VFR BLD AUTO: 39.9 % (ref 39–53)
HGB BLD-MCNC: 13 GM/DL (ref 13–17.5)
LYMPHOCYTES # SPEC AUTO: 0.4 K/UL (ref 1–4.8)
LYMPHOCYTES NFR SPEC AUTO: 4 %
MAGNESIUM SPEC-SCNC: 2.3 MG/DL (ref 1.6–2.3)
MCH RBC QN AUTO: 28.5 PG (ref 25–35)
MCHC RBC AUTO-ENTMCNC: 32.6 G/DL (ref 31–37)
MCV RBC AUTO: 87.3 FL (ref 80–100)
MONOCYTES # BLD AUTO: 0.9 K/UL (ref 0–1)
MONOCYTES NFR BLD AUTO: 8 %
NEUTROPHILS # BLD AUTO: 10 K/UL (ref 1.3–7.7)
NEUTROPHILS NFR BLD AUTO: 85 %
PLATELET # BLD AUTO: 374 K/UL (ref 150–450)
POTASSIUM SERPL-SCNC: 4.7 MMOL/L (ref 3.5–5.1)
PROT SERPL-MCNC: 5.8 G/DL (ref 6.3–8.2)
SODIUM SERPL-SCNC: 133 MMOL/L (ref 137–145)
WBC # BLD AUTO: 11.7 K/UL (ref 3.8–10.6)

## 2022-01-20 PROCEDURE — 83615 LACTATE (LD) (LDH) ENZYME: CPT

## 2022-01-20 PROCEDURE — 80053 COMPREHEN METABOLIC PANEL: CPT

## 2022-01-20 PROCEDURE — 85384 FIBRINOGEN ACTIVITY: CPT

## 2022-01-20 PROCEDURE — 93970 EXTREMITY STUDY: CPT

## 2022-01-20 PROCEDURE — 83605 ASSAY OF LACTIC ACID: CPT

## 2022-01-20 PROCEDURE — 84484 ASSAY OF TROPONIN QUANT: CPT

## 2022-01-20 PROCEDURE — 81001 URINALYSIS AUTO W/SCOPE: CPT

## 2022-01-20 PROCEDURE — 85025 COMPLETE CBC W/AUTO DIFF WBC: CPT

## 2022-01-20 PROCEDURE — 83735 ASSAY OF MAGNESIUM: CPT

## 2022-01-20 PROCEDURE — 36415 COLL VENOUS BLD VENIPUNCTURE: CPT

## 2022-01-20 PROCEDURE — 96361 HYDRATE IV INFUSION ADD-ON: CPT

## 2022-01-20 PROCEDURE — 83036 HEMOGLOBIN GLYCOSYLATED A1C: CPT

## 2022-01-20 PROCEDURE — 93005 ELECTROCARDIOGRAM TRACING: CPT

## 2022-01-20 PROCEDURE — 96374 THER/PROPH/DIAG INJ IV PUSH: CPT

## 2022-01-20 PROCEDURE — 87502 INFLUENZA DNA AMP PROBE: CPT

## 2022-01-20 PROCEDURE — 87040 BLOOD CULTURE FOR BACTERIA: CPT

## 2022-01-20 PROCEDURE — 82728 ASSAY OF FERRITIN: CPT

## 2022-01-20 PROCEDURE — 85379 FIBRIN DEGRADATION QUANT: CPT

## 2022-01-20 PROCEDURE — 84145 PROCALCITONIN (PCT): CPT

## 2022-01-20 PROCEDURE — 71045 X-RAY EXAM CHEST 1 VIEW: CPT

## 2022-01-20 PROCEDURE — 86140 C-REACTIVE PROTEIN: CPT

## 2022-01-20 PROCEDURE — 87635 SARS-COV-2 COVID-19 AMP PRB: CPT

## 2022-01-20 PROCEDURE — 99291 CRITICAL CARE FIRST HOUR: CPT

## 2022-01-20 RX ADMIN — ENOXAPARIN SODIUM SCH MG: 40 INJECTION SUBCUTANEOUS at 22:51

## 2022-01-20 NOTE — XR
EXAMINATION TYPE: XR chest 1V portable

 

DATE OF EXAM: 1/20/2022 8:28 PM

 

COMPARISON:Multiple radiographs, with the most recent on or 20/4/2018

 

TECHNIQUE: Frontal view of the chest.

 

CLINICAL INDICATION:Male, 76 years old with history of dyspnea;

 

FINDINGS: 

Lungs/Pleura: Streaky atelectasis seen within the left midlung, similar prior. There is no evidence o
f pleural effusion, focal consolidation, or pneumothorax.  Stable right lower lobe nodule. 

Pulmonary vascularity: Unremarkable.

Heart/mediastinum: Cardiomediastinal silhouette is unremarkable.  

Musculoskeletal: No acute osseous pathology.

Other findings: Midline sternotomy wires and surgical clips project over the mediastinum.

 

IMPRESSION: 

Chronic changes without acute pulmonary process. No significant change from prior.

## 2022-01-20 NOTE — ED
SOB HPI





- General


Chief Complaint: Shortness of Breath


Stated Complaint: Cough


Time Seen by Provider: 22 19:59


Source: patient, family, RN notes reviewed


Mode of arrival: wheelchair


Limitations: altered mental status





- History of Present Illness


Initial Comments: 





This is an unvaccinated 76-year-old male with a history of cardiac disease, 

hyperlipidemia, and hypertension.  He presents to the emergency department today

complaining of ongoing worsening shortness of breath, fever, chills, and 

generalized weakness over the past 2 weeks.  According to the patient's wife he 

became much worse over the past 2 days.  Patient not eating well.  Mild 

tachypnea noted


Generalized weakness, patient does complain of diminished appetite.  no changes 

in vision or hearing, no sore throat or difficulty with speech, no neck pain, no

chest pain or shortness of breath, no abdominal pain, no nausea or vomiting, no 

changes in urination or bowel movements, no numbness or tingling, no extremity 

pain, no skin rashes or lesions.





- Related Data


                                Home Medications











 Medication  Instructions  Recorded  Confirmed


 


DULoxetine HCL [Cymbalta] 30 mg PO DAILY 19


 


amLODIPine [Norvasc] 5 mg PO DAILY 19


 


Aspirin EC [Ecotrin Low Dose] 81 mg PO DAILY 22


 


Atorvastatin [Lipitor] 40 mg PO HS 22


 


Donepezil [Aricept] 5 mg PO DAILY 22


 


Dulaglutide [Trulicity] 0.75 mg SQ SA 22


 


Insulin Glargine [Lantus Vial] 26 unit SQ AC-SUPPER 22


 


Insulin Glargine [Lantus Vial] 32 unit SQ AC-BRKFST 22


 


Lisinopril [Prinivil] 10 mg PO DAILY 22


 


Memantine [Namenda] 10 mg PO BID 22


 


Omeprazole 20 mg PO DAILY 22


 


QUEtiapine [SEROquel] 25 mg PO HS 22


 


carvediloL [Coreg*] 12.5 mg PO DAILY 22








                                  Previous Rx's











 Medication  Instructions  Recorded


 


Clopidogrel [Plavix] 75 mg PO DAILY  tab 19


 


Nitroglycerin Sl Tabs [Nitrostat] 0.4 mg SUBLINGUAL Q5M PRN  tab 19











                                    Allergies











Allergy/AdvReac Type Severity Reaction Status Date / Time


 


No Known Allergies Allergy   Verified 22 21:29














Review of Systems


ROS Statement: 


Those systems with pertinent positive or pertinent negative responses have been 

documented in the HPI.





ROS Other: All systems not noted in ROS Statement are negative.





Past Medical History


Past Medical History: Coronary Artery Disease (CAD), Chest Pain / Angina, 

Diabetes Mellitus, Hyperlipidemia, Hypertension


Additional Past Medical History / Comment(s): diverticulosis, mild stm loss, 

arthritis


History of Any Multi-Drug Resistant Organisms: None Reported


Past Surgical History: Coronary Bypass/CABG, Heart Catheterization, Heart 

Catheterization With Stent


Additional Past Surgical History / Comment(s): CABG .  CATARACTS.  BILATERAL

 EYE SURGERY (DIABETIC) right illiac stent placed by Dr May 19


Past Anesthesia/Blood Transfusion Reactions: No Reported Reaction


Date of Last Stent Placement:: UNK


Past Psychological History: Depression


Smoking Status: Former smoker


Past Alcohol Use History: None Reported


Past Drug Use History: None Reported





- Past Family History


  ** Mother


Family Medical History: CVA/TIA, Diabetes Mellitus





  ** Father


Family Medical History: Coronary Artery Disease (CAD), Myocardial Infarction 

(MI)


Additional Family Medical History / Comment(s): passed at 42





  ** Brother(s)


Family Medical History: Coronary Artery Disease (CAD), Myocardial Infarction 

(MI)


Additional Family Medical History / Comment(s): 2 brothers  young in 40's 

and 50's





General Exam





- General Exam Comments


Initial Comments: 





Somewhat ill-appearing 76-year-old male in minimal distress.  Patient does not 

appear to be toxic.  No increased work of breathing.  SpO2 on room air was 88%.


Limitations: altered mental status


General appearance: alert, in no apparent distress


Head exam: Present: atraumatic, normocephalic, normal inspection


Eye exam: Present: normal appearance, PERRL, EOMI.  Absent: scleral icterus, 

conjunctival injection, periorbital swelling


ENT exam: Present: normal exam, mucous membranes moist.  Absent: normal 

oropharynx, mucous membranes dry


Neck exam: Present: normal inspection.  Absent: tenderness, meningismus, full 

ROM, lymphadenopathy


Respiratory exam: Present: respiratory distress, rales.  Absent: normal lung 

sounds bilaterally, wheezes, rhonchi, stridor


Cardiovascular Exam: Present: regular rate, normal rhythm, normal heart sounds. 

 Absent: systolic murmur, diastolic murmur, rubs, gallop, clicks


GI/Abdominal exam: Present: soft, normal bowel sounds.  Absent: distended, 

tenderness, guarding, rebound, rigid


Extremities exam: Present: normal inspection, full ROM, normal capillary refill.

  Absent: tenderness, pedal edema, joint swelling, calf tenderness


Back exam: Present: normal inspection


Neurological exam: Present: alert, oriented X3, CN II-XII intact


Psychiatric exam: Present: normal affect, normal mood


Skin exam: Present: warm, dry, intact, normal color.  Absent: rash





Course


                                   Vital Signs











  22





  19:51


 


Temperature 100.9 F H


 


Pulse Rate 78


 


Respiratory 28 H





Rate 


 


Blood Pressure 112/66


 


O2 Sat by Pulse 88 L





Oximetry 














- Reevaluation(s)


Reevaluation #1: 





22 21:43


Patient reevaluated and is improved on 2 L per nasal cannula.  Patient COVID-19 

positive.  First dose of dexamethasone given.  Consultation with hospitalist 

ordered.





Medical Decision Making





- Medical Decision Making





Patient appears to have adequate peripheral profusion.  Capillary refill less 

than 2 seconds.  Mild distress.  Does not appear to be toxic.  Likely COVID-19 

given the COVID-19 pandemic.  Other infectious etiology is possible to include 

bacterial pneumonia, viral bronchitis, or other infectious etiology.





Case was discussed with the on-call hospitalist physician, Dr. Bennett





The case was discussed in detail with ED attending physician.  Presentation, 

findings, treatment plan discussed in detail.





- Lab Data


Result diagrams: 


                                 22 20:30





                                 22 20:30


                                   Lab Results











  22 Range/Units





  20:30 20:30 20:30 


 


WBC  11.7 H    (3.8-10.6)  k/uL


 


RBC  4.57    (4.30-5.90)  m/uL


 


Hgb  13.0    (13.0-17.5)  gm/dL


 


Hct  39.9    (39.0-53.0)  %


 


MCV  87.3    (80.0-100.0)  fL


 


MCH  28.5    (25.0-35.0)  pg


 


MCHC  32.6    (31.0-37.0)  g/dL


 


RDW  14.0    (11.5-15.5)  %


 


Plt Count  374    (150-450)  k/uL


 


MPV  8.8    


 


Neutrophils %  85    %


 


Lymphocytes %  4    %


 


Monocytes %  8    %


 


Eosinophils %  0    %


 


Basophils %  0    %


 


Neutrophils #  10.0 H    (1.3-7.7)  k/uL


 


Lymphocytes #  0.4 L    (1.0-4.8)  k/uL


 


Monocytes #  0.9    (0-1.0)  k/uL


 


Eosinophils #  0.0    (0-0.7)  k/uL


 


Basophils #  0.0    (0-0.2)  k/uL


 


Sodium   133 L   (137-145)  mmol/L


 


Potassium   4.7   (3.5-5.1)  mmol/L


 


Chloride   103   ()  mmol/L


 


Carbon Dioxide   20 L   (22-30)  mmol/L


 


Anion Gap   10   mmol/L


 


BUN   34 H   (9-20)  mg/dL


 


Creatinine   1.35 H   (0.66-1.25)  mg/dL


 


Est GFR (CKD-EPI)AfAm   58   (>60 ml/min/1.73 sqM)  


 


Est GFR (CKD-EPI)NonAf   50   (>60 ml/min/1.73 sqM)  


 


Glucose   281 H   (74-99)  mg/dL


 


Plasma Lactic Acid Omero    1.3  (0.7-2.0)  mmol/L


 


Calcium   7.9 L   (8.4-10.2)  mg/dL


 


Magnesium   2.3   (1.6-2.3)  mg/dL


 


Total Bilirubin   1.2   (0.2-1.3)  mg/dL


 


AST   40   (17-59)  U/L


 


ALT   29   (4-49)  U/L


 


Alkaline Phosphatase   122   ()  U/L


 


Troponin I     (0.000-0.034)  ng/mL


 


Total Protein   5.8 L   (6.3-8.2)  g/dL


 


Albumin   3.0 L   (3.5-5.0)  g/dL


 


Coronavirus (PCR)     (Not Detectd)  


 


Influenza Type A RNA     (Not Detectd)  


 


Influenza Type B (PCR)     (Not Detectd)  














  22 Range/Units





  20:30 20:30 20:30 


 


WBC     (3.8-10.6)  k/uL


 


RBC     (4.30-5.90)  m/uL


 


Hgb     (13.0-17.5)  gm/dL


 


Hct     (39.0-53.0)  %


 


MCV     (80.0-100.0)  fL


 


MCH     (25.0-35.0)  pg


 


MCHC     (31.0-37.0)  g/dL


 


RDW     (11.5-15.5)  %


 


Plt Count     (150-450)  k/uL


 


MPV     


 


Neutrophils %     %


 


Lymphocytes %     %


 


Monocytes %     %


 


Eosinophils %     %


 


Basophils %     %


 


Neutrophils #     (1.3-7.7)  k/uL


 


Lymphocytes #     (1.0-4.8)  k/uL


 


Monocytes #     (0-1.0)  k/uL


 


Eosinophils #     (0-0.7)  k/uL


 


Basophils #     (0-0.2)  k/uL


 


Sodium     (137-145)  mmol/L


 


Potassium     (3.5-5.1)  mmol/L


 


Chloride     ()  mmol/L


 


Carbon Dioxide     (22-30)  mmol/L


 


Anion Gap     mmol/L


 


BUN     (9-20)  mg/dL


 


Creatinine     (0.66-1.25)  mg/dL


 


Est GFR (CKD-EPI)AfAm     (>60 ml/min/1.73 sqM)  


 


Est GFR (CKD-EPI)NonAf     (>60 ml/min/1.73 sqM)  


 


Glucose     (74-99)  mg/dL


 


Plasma Lactic Acid Omero     (0.7-2.0)  mmol/L


 


Calcium     (8.4-10.2)  mg/dL


 


Magnesium     (1.6-2.3)  mg/dL


 


Total Bilirubin     (0.2-1.3)  mg/dL


 


AST     (17-59)  U/L


 


ALT     (4-49)  U/L


 


Alkaline Phosphatase     ()  U/L


 


Troponin I  <0.012    (0.000-0.034)  ng/mL


 


Total Protein     (6.3-8.2)  g/dL


 


Albumin     (3.5-5.0)  g/dL


 


Coronavirus (PCR)    Detected A  (Not Detectd)  


 


Influenza Type A RNA   Not Detected   (Not Detectd)  


 


Influenza Type B (PCR)   Not Detected   (Not Detectd)  














- EKG Data


EKG Comments: 





EKG done at 2155 read by the ED attending physician reveals normal sinus rhythm 

with a left axis deviation.  Rate of 68, normal intervals, no acute ST or T-wave

 changes.





Critical Care Time


Critical Care Time: Yes


Total Critical Care Time: 31


Critical Care Time: 





Multiple reassessments, reevaluation patient's response to oxygenation, 

laboratory investigations, diagnostic studies.  Discussion with the internal 

medicine physician.  Discussion with ED attending physician.





Disposition


Clinical Impression: 


 Acute hypoxemic respiratory failure due to COVID-19, Generalized weakness





Disposition: ADMITTED AS IP TO THIS HOSP


Referrals: 


Shaggy Adames MD [Primary Care Provider] - 1-2 days

## 2022-01-21 LAB
ALBUMIN SERPL-MCNC: 2.7 G/DL (ref 3.5–5)
ALP SERPL-CCNC: 113 U/L (ref 38–126)
ALT SERPL-CCNC: 25 U/L (ref 4–49)
ANION GAP SERPL CALC-SCNC: 9 MMOL/L
AST SERPL-CCNC: 30 U/L (ref 17–59)
BASOPHILS # BLD AUTO: 0 K/UL (ref 0–0.2)
BASOPHILS NFR BLD AUTO: 0 %
BUN SERPL-SCNC: 34 MG/DL (ref 9–20)
CALCIUM SPEC-MCNC: 8.1 MG/DL (ref 8.4–10.2)
CHLORIDE SERPL-SCNC: 106 MMOL/L (ref 98–107)
CO2 SERPL-SCNC: 21 MMOL/L (ref 22–30)
EOSINOPHIL # BLD AUTO: 0 K/UL (ref 0–0.7)
EOSINOPHIL NFR BLD AUTO: 0 %
ERYTHROCYTE [DISTWIDTH] IN BLOOD BY AUTOMATED COUNT: 4.58 M/UL (ref 4.3–5.9)
ERYTHROCYTE [DISTWIDTH] IN BLOOD: 13.9 % (ref 11.5–15.5)
FERRITIN SERPL-MCNC: 752 NG/ML (ref 22–322)
FERRITIN SERPL-MCNC: 863 NG/ML (ref 22–322)
FIBRINOGEN PPP-MCNC: 844 MG/DL (ref 200–500)
FIBRINOGEN PPP-MCNC: 848 MG/DL (ref 200–500)
GLUCOSE BLD-MCNC: 237 MG/DL (ref 75–99)
GLUCOSE BLD-MCNC: 249 MG/DL (ref 75–99)
GLUCOSE BLD-MCNC: 255 MG/DL (ref 75–99)
GLUCOSE BLD-MCNC: 330 MG/DL (ref 75–99)
GLUCOSE BLD-MCNC: 385 MG/DL (ref 75–99)
GLUCOSE SERPL-MCNC: 287 MG/DL (ref 74–99)
GLUCOSE UR QL: (no result)
HCT VFR BLD AUTO: 40.7 % (ref 39–53)
HGB BLD-MCNC: 13 GM/DL (ref 13–17.5)
HYALINE CASTS UR QL AUTO: 4 /LPF (ref 0–2)
LDH SPEC-CCNC: 937 U/L (ref 313–618)
LYMPHOCYTES # SPEC AUTO: 0.5 K/UL (ref 1–4.8)
LYMPHOCYTES NFR SPEC AUTO: 7 %
MAGNESIUM SPEC-SCNC: 2.4 MG/DL (ref 1.6–2.3)
MCH RBC QN AUTO: 28.4 PG (ref 25–35)
MCHC RBC AUTO-ENTMCNC: 31.9 G/DL (ref 31–37)
MCV RBC AUTO: 88.9 FL (ref 80–100)
MONOCYTES # BLD AUTO: 0.5 K/UL (ref 0–1)
MONOCYTES NFR BLD AUTO: 6 %
NEUTROPHILS # BLD AUTO: 6 K/UL (ref 1.3–7.7)
NEUTROPHILS NFR BLD AUTO: 84 %
PH UR: 5.5 [PH] (ref 5–8)
PLATELET # BLD AUTO: 362 K/UL (ref 150–450)
POTASSIUM SERPL-SCNC: 4.4 MMOL/L (ref 3.5–5.1)
PROT SERPL-MCNC: 5.6 G/DL (ref 6.3–8.2)
PROT UR QL: (no result)
RBC UR QL: <1 /HPF (ref 0–5)
SODIUM SERPL-SCNC: 136 MMOL/L (ref 137–145)
SP GR UR: 1.02 (ref 1–1.03)
SQUAMOUS UR QL AUTO: <1 /HPF (ref 0–4)
UROBILINOGEN UR QL STRIP: 3 MG/DL (ref ?–2)
WBC # BLD AUTO: 7.2 K/UL (ref 3.8–10.6)
WBC # UR AUTO: 2 /HPF (ref 0–5)

## 2022-01-21 PROCEDURE — 3E0F7SF INTRODUCTION OF OTHER GAS INTO RESPIRATORY TRACT, VIA NATURAL OR ARTIFICIAL OPENING: ICD-10-PCS

## 2022-01-21 RX ADMIN — CEFAZOLIN SCH MLS/HR: 330 INJECTION, POWDER, FOR SOLUTION INTRAMUSCULAR; INTRAVENOUS at 12:16

## 2022-01-21 RX ADMIN — CEFAZOLIN SCH MLS/HR: 330 INJECTION, POWDER, FOR SOLUTION INTRAMUSCULAR; INTRAVENOUS at 02:00

## 2022-01-21 RX ADMIN — INSULIN ASPART SCH UNIT: 100 INJECTION, SOLUTION INTRAVENOUS; SUBCUTANEOUS at 20:16

## 2022-01-21 RX ADMIN — INSULIN ASPART SCH UNIT: 100 INJECTION, SOLUTION INTRAVENOUS; SUBCUTANEOUS at 12:15

## 2022-01-21 RX ADMIN — CLOPIDOGREL BISULFATE SCH MG: 75 TABLET ORAL at 12:15

## 2022-01-21 RX ADMIN — Medication SCH MG: at 07:43

## 2022-01-21 RX ADMIN — INSULIN ASPART SCH UNIT: 100 INJECTION, SOLUTION INTRAVENOUS; SUBCUTANEOUS at 07:43

## 2022-01-21 RX ADMIN — ASPIRIN 81 MG CHEWABLE TABLET SCH MG: 81 TABLET CHEWABLE at 12:15

## 2022-01-21 RX ADMIN — DONEPEZIL HYDROCHLORIDE SCH MG: 5 TABLET ORAL at 12:15

## 2022-01-21 RX ADMIN — ATORVASTATIN CALCIUM SCH MG: 40 TABLET, FILM COATED ORAL at 20:16

## 2022-01-21 RX ADMIN — MEMANTINE HYDROCHLORIDE SCH MG: 10 TABLET ORAL at 20:16

## 2022-01-21 RX ADMIN — CHOLECALCIFEROL TAB 125 MCG (5000 UNIT) SCH MCG: 125 TAB at 07:43

## 2022-01-21 RX ADMIN — MEMANTINE HYDROCHLORIDE SCH MG: 10 TABLET ORAL at 12:15

## 2022-01-21 RX ADMIN — OXYCODONE HYDROCHLORIDE AND ACETAMINOPHEN SCH MG: 500 TABLET ORAL at 07:43

## 2022-01-21 RX ADMIN — INSULIN ASPART SCH UNIT: 100 INJECTION, SOLUTION INTRAVENOUS; SUBCUTANEOUS at 17:29

## 2022-01-21 NOTE — P.PN
Progress Note - Text


Progress Note Date: 01/21/22





RN notified me of short runs of non sustained Vtach, patient asymptomatic , no 

history of CHF, positive history of CAD. electrolytes reviewed and Mg and K are 

at goal. patient on low dose BB. 


patient admitted for covid and hypoxemia 





defer to day team for further evaluation and monitoring , if these events are 

persistent then consider cardio consult

## 2022-01-21 NOTE — P.HPIM
History of Present Illness


H&P Date: 22





Patient is a 76-year-old male with a PMH of hypertension, hyperkalemia, coronary

artery disease, type II DM who was brought into the emergency room by her family

members due to gradually worsening shortness of breath.  History is obtained 

from the chart and from the ED staff as the patient was very confused at the 

time of interview and the family could not be reached via the numbers in the 

chart.  The patient did reportedly been experiencing gradually worsening 

shortness of breath, fever, chills, and diffuse lethargy and weakness over the 

past 1-2 weeks which had acutely worsened over the past 2 days.  The patient 

also was noted to have decreased appetite and had appeared to be tachypneic to 

the family members, which prompted them to bring him to the emergency room. The 

patient is unvaccinated against COVID 19. In the emergency room, the patient's 

laboratory evaluation revealed coronavirus PCR positive, sodium 133, BUN 34, 

creatinine 1.35, and glucose 281.  Chest x-ray revealed chronic changes without 

any acute process with EKG showing normal sinus rhythm at 68 bpm with left axis 

deviation.  The patient was hypoxic in the emergency room presentation with SpO2

88% on room air, afebrile to 100.9F, and tachypneic with 28 respiratory rate.





Review of systems:


Unable to perform as patient was confused and not answering questions 

appropriately





Physical examination:


General: non toxic, no distress, appears at stated age, normal weight


Derm: no unusual rashes/lesions no unusual ecchymoses, warm, dry


Head: atraumatic, normocephalic, symmetric


Eyes: EOMI, no lid lag, anicteric sclera, pupils equal round reactive to light


ENT: Nose and ears atraumatic, no thrush,  no pharyngeal erythema


Neck: No thyromegaly, no cervical lymphadenopathy, trachea midline, supple


Mouth: no lip lesion, mucus membranes moist


Cardiovascular: S1S2 reg, no murmur, positive posterior tibial pulse bilateral, 

no edema, capillary refill less than 2 seconds


Lungs: CTA bilateral, no rhonchi, no rales , no accessory muscle use


Abdominal: soft,  nontender to palpation, no guarding, no appreciable organome

aleshia, normal bowel sounds


Ext: no gross muscle atrophy,  muscle strength 3 out of 5 in all 4 extremities 

grossly, no contractures, 


Neuro:  CN II-XI grossly intact, light touch intact all 4 extremities, finger to

nose within normal limits,


Psych: Awake, confused, not answering questions appropriately, oriented only to 

person and partially to place 





Assessment/plan





Acute hypoxic respiratory failure secondary to COVID-19 pneumonitis


-Continue supplemental oxygen


-Pulmonary consult


-Zinc, melatonin, vitamin C, vitamin D


-Monitor inflammatory markers





Acute kidney injury, likely secondary to dehydration


-Continue with IV fluids





Chronic conditions: Type 2 DM, Hypertension, hyperlipidemia


-Lispro insulin sliding scale blood glucose monitoring


-Check A1c


-Continue with home medications





DVT prophylaxis


-Lovenox





The patient is admitted with an anticipated greater than 2 midnight stay for lauren

luation of COVID


CODE STATUS:Full Code


Discussed with: Patient


Anticipated discharge date: Unknown


Anticipated discharge place: Home








Past Medical History


Past Medical History: Coronary Artery Disease (CAD), Chest Pain / Angina, 

Diabetes Mellitus, Hyperlipidemia, Hypertension


Additional Past Medical History / Comment(s): diverticulosis, mild stm loss, 

arthritis


History of Any Multi-Drug Resistant Organisms: None Reported


Past Surgical History: Coronary Bypass/CABG, Heart Catheterization, Heart 

Catheterization With Stent


Additional Past Surgical History / Comment(s): CABG .  CATARACTS.  BILATERAL

EYE SURGERY (DIABETIC) right illiac stent placed by Dr Mya 19


Past Anesthesia/Blood Transfusion Reactions: No Reported Reaction


Date of Last Stent Placement:: UNK


Past Psychological History: Depression


Smoking Status: Former smoker


Past Alcohol Use History: None Reported


Past Drug Use History: None Reported





- Past Family History


  ** Mother


Family Medical History: CVA/TIA, Diabetes Mellitus





  ** Father


Family Medical History: Coronary Artery Disease (CAD), Myocardial Infarction 

(MI)


Additional Family Medical History / Comment(s): passed at 42





  ** Brother(s)


Family Medical History: Coronary Artery Disease (CAD), Myocardial Infarction 

(MI)


Additional Family Medical History / Comment(s): 2 brothers  young in 40's 

and 50's





Medications and Allergies


                                Home Medications











 Medication  Instructions  Recorded  Confirmed  Type


 


Clopidogrel [Plavix] 75 mg PO DAILY  tab 19 Rx


 


Nitroglycerin Sl Tabs [Nitrostat] 0.4 mg SUBLINGUAL Q5M PRN  tab 19 Rx


 


DULoxetine HCL [Cymbalta] 30 mg PO DAILY 19 History


 


amLODIPine [Norvasc] 5 mg PO DAILY 19 History


 


Aspirin EC [Ecotrin Low Dose] 81 mg PO DAILY 22 History


 


Atorvastatin [Lipitor] 40 mg PO HS 22 History


 


Donepezil [Aricept] 5 mg PO DAILY 22 History


 


Dulaglutide [Trulicity] 0.75 mg SQ SA 22 History


 


Insulin Glargine [Lantus Vial] 26 unit SQ AC-SUPPER 22 History


 


Insulin Glargine [Lantus Vial] 32 unit SQ AC-BRKFST 22 History


 


Lisinopril [Prinivil] 10 mg PO DAILY 22 History


 


Memantine [Namenda] 10 mg PO BID 22 History


 


Omeprazole 20 mg PO DAILY 22 History


 


QUEtiapine [SEROquel] 25 mg PO HS 22 History


 


carvediloL [Coreg*] 12.5 mg PO DAILY 22 History








                                    Allergies











Allergy/AdvReac Type Severity Reaction Status Date / Time


 


No Known Allergies Allergy   Verified 22 21:29














Physical Exam


Vitals: 


                                   Vital Signs











  Temp Pulse Resp BP Pulse Ox


 


 22 00:21  97.8 F  65  18  110/70  96


 


 22 20:55    18  


 


 22 19:51  100.9 F H  78  28 H  112/66  88 L








                                Intake and Output











 22





 14:59 22:59 06:59


 


Other:   


 


  Weight  81.647 kg 














Results


CBC & Chem 7: 


                                 22 20:30





                                 22 20:30


Labs: 


                  Abnormal Lab Results - Last 24 Hours (Table)











  22 Range/Units





  20:30 20:30 20:30 


 


WBC  11.7 H    (3.8-10.6)  k/uL


 


Neutrophils #  10.0 H    (1.3-7.7)  k/uL


 


Lymphocytes #  0.4 L    (1.0-4.8)  k/uL


 


Sodium   133 L   (137-145)  mmol/L


 


Carbon Dioxide   20 L   (22-30)  mmol/L


 


BUN   34 H   (9-20)  mg/dL


 


Creatinine   1.35 H   (0.66-1.25)  mg/dL


 


Glucose   281 H   (74-99)  mg/dL


 


POC Glucose (mg/dL)     (75-99)  mg/dL


 


Calcium   7.9 L   (8.4-10.2)  mg/dL


 


Total Protein   5.8 L   (6.3-8.2)  g/dL


 


Albumin   3.0 L   (3.5-5.0)  g/dL


 


Coronavirus (PCR)    Detected A  (Not Detectd)  














  22 Range/Units





  00:35 


 


WBC   (3.8-10.6)  k/uL


 


Neutrophils #   (1.3-7.7)  k/uL


 


Lymphocytes #   (1.0-4.8)  k/uL


 


Sodium   (137-145)  mmol/L


 


Carbon Dioxide   (22-30)  mmol/L


 


BUN   (9-20)  mg/dL


 


Creatinine   (0.66-1.25)  mg/dL


 


Glucose   (74-99)  mg/dL


 


POC Glucose (mg/dL)  249 H  (75-99)  mg/dL


 


Calcium   (8.4-10.2)  mg/dL


 


Total Protein   (6.3-8.2)  g/dL


 


Albumin   (3.5-5.0)  g/dL


 


Coronavirus (PCR)   (Not Detectd) Yes

## 2022-01-21 NOTE — P.CNPUL
History of Present Illness


Consult date: 22


Requesting physician: Dani Bennett


Reason for consult: dyspnea


Chief complaint: Shortness of breath


History of present illness: 


 This 76-year-old white male patient with advanced dementia, who is a very poor 

historian, and prior history of cardiac disease, hyperlipidemia, hypertension, 

previous history of coronary artery bypass grafting surgery, diabetes mellitus, 

hyperlipidemia, former smoker, peripheral vascular disease with prior history of

right iliac stent placement, and depression who was brought into the hospital on

2022 for evaluation of shortness of breath according to the ED 

documentation.  Patient is very confused, he is not able to tell me what brought

him to the hospital.  However does not appear to be in any acute distress, he 

states his wife brought him in.  He is only oriented 1, he did not know he was 

in the hospital, he is not able to tell me the exact date.  He seems to have 

delayed verbal response to questioning, but he is awake, he is trying to feed 

himself.  He denies being short of breath, he states that he does cough all the 

time.  Denies any chest discomfort.  Is currently on 2 L of oxygen his pulse ox 

is 96%, according to the ED documentation patient was complaining of ongoing 

worsening shortness of breath, fever chills, generalized weakness over the past 

2 weeks, according to the wife patient was diagnosed with COVID-19 on 

2022, he is non-vaccinated for COVID-19, his breathing became much worse, 

he was very weak and unable to walk, she was not eating well.  Chest x-ray in 

emergency department showed chronic changes without acute pulmonary process.  

COVID-19 PCR was positive, influenza A and B PCR were negative, his white count 

was 11.7, hemoglobin was 13.0, fibrinogen level was 844, d-dimer was 1.5, sodium

is 133, potassium is 4.7, CO2 is 20, BUN is 34 creatinine is 1.35, lactic acid 

was 1.3.  LFTs are within normal limits, LDH was 937, CRP was 20.8, troponin was

less than 0.012, pro calcitonin level was 0.55.  Patient is outside the window 

for Remdesivir, he was started on Decadron 6 mg daily, he is on prophylactic 

Lovenox 40 mg daily, he was given gentle IV hydration with 500 mL IV fluid bolus

in the emergency department, his maintenance IV fluids infusing at rate of 75 ML

per hour.  Blood culture has been sent and is pending.  Patient did have a low-

grade fever overnight with a temp of 100.9F.  He is afebrile this morning, his 

appetite is clean, he is feeding himself.  Lung sounds reveal scattered 

crackles, no rhonchi, no wheezing, patient denies any chest pain, abdomen is 

soft, denies any nausea, no diarrhea.








Review of Systems


All systems: negative


Constitutional: Denies chills, Denies fever


Eyes: denies blurred vision, denies pain


Ears, nose, mouth and throat: Denies headache, Denies sore throat


Cardiovascular: Denies chest pain, Denies shortness of breath


Respiratory: Reports dyspnea, Denies cough


Gastrointestinal: Denies abdominal pain, Denies diarrhea, Denies nausea, Denies 

vomiting


Musculoskeletal: Denies myalgias


Integumentary: Denies pruritus, Denies rash


Neurological: Denies numbness, Denies weakness


Psychiatric: Denies anxiety, Denies depression


Endocrine: Denies fatigue, Denies weight change





Past Medical History


Past Medical History: Coronary Artery Disease (CAD), Chest Pain / Angina, 

Dementia, Diabetes Mellitus, Eye Disorder, GERD/Reflux, Hyperlipidemia, 

Hypertension, Memory Impairment, Vascular Disorder


Additional Past Medical History / Comment(s): Calli states pt tested covid + 

either 1/10/22 or 22 at [facility], operated by [facility].   Other hx:  IDDM type II, 

spouse states lately pt's appetite has been poor and his blood sugars low, 

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 

not sure what disease, PVD/intermittent claudication/R leg surgery with stent, 

arthritis bilateral hands, chronic back pain/DDD, diverticular disease


History of Any Multi-Drug Resistant Organisms: None Reported


Past Surgical History: Coronary Bypass/CABG, Heart Catheterization, Heart 

Catheterization With Stent


Additional Past Surgical History / Comment(s):  CABG 4 vessel, PCI with 

stent in [place] approximately 15 yrs ago, 2019 R iliac PTBA/stent, bilateral 

eye surgery for "diabetic eye problem"


Past Anesthesia/Blood Transfusion Reactions: No Reported Reaction


Date of Last Stent Placement:: approximately 


Smoking Status: Former smoker





- Past Family History


  ** Mother


Family Medical History: CVA/TIA, Diabetes Mellitus





  ** Father


Family Medical History: Coronary Artery Disease (CAD), Myocardial Infarction 

(MI)


Additional Family Medical History / Comment(s): passed at 42 from MI





  ** Brother(s)


Family Medical History: Coronary Artery Disease (CAD), Myocardial Infarction 

(MI)


Additional Family Medical History / Comment(s): 2 brothers  young in 40's 

and 50's from MIs.





Medications and Allergies


                                Home Medications











 Medication  Instructions  Recorded  Confirmed  Type


 


Clopidogrel [Plavix] 75 mg PO DAILY  tab 19 Rx


 


Nitroglycerin Sl Tabs [Nitrostat] 0.4 mg SUBLINGUAL Q5M PRN  tab 19 Rx


 


DULoxetine HCL [Cymbalta] 30 mg PO DAILY 19 History


 


amLODIPine [Norvasc] 5 mg PO DAILY 19 History


 


Aspirin EC [Ecotrin Low Dose] 81 mg PO DAILY 22 History


 


Atorvastatin [Lipitor] 40 mg PO HS 22 History


 


Donepezil [Aricept] 5 mg PO DAILY 22 History


 


Dulaglutide [Trulicity] 0.75 mg SQ SA 22 History


 


Insulin Glargine [Lantus Vial] 26 unit SQ AC-SUPPER 22 History


 


Insulin Glargine [Lantus Vial] 32 unit SQ AC-BRKFST 22 History


 


Lisinopril [Prinivil] 10 mg PO DAILY 22 History


 


Memantine [Namenda] 10 mg PO BID 22 History


 


Omeprazole 20 mg PO DAILY 22 History


 


QUEtiapine [SEROquel] 25 mg PO HS 22 History


 


carvediloL [Coreg*] 12.5 mg PO DAILY 22 History








                                    Allergies











Allergy/AdvReac Type Severity Reaction Status Date / Time


 


No Known Allergies Allergy   Verified 22 21:29














Physical Exam


Vitals: 


                                   Vital Signs











  Temp Pulse Pulse Resp BP BP Pulse Ox


 


 22 10:10  97.7 F   70  18   140/61  96


 


 22 04:40        96


 


 22 04:39  97.6 F   60  15   132/61 


 


 22 04:32     22   


 


 22 03:15  99.9 F H      


 


 22 03:00  99.0 F  66   22  128/60   96


 


 22 00:21  97.8 F  65   18  110/70   96


 


 22 20:55     18   


 


 22 19:51  100.9 F H  78   28 H  112/66   88 L








                                Intake and Output











 22





 22:59 06:59 14:59


 


Intake Total  150 780


 


Balance  150 780


 


Intake:   


 


  IV  150 


 


    Sodium Chloride 0.9% 1,  150 





    000 ml @ 75 mls/hr IV .   





    [id number] [facility] Rx#:[id number]   


 


  Intake, IV Titration   600





  Amount   


 


    Sodium Chloride 0.9% 1,   600





    000 ml @ 75 mls/hr IV .   





    [id number] [facility] Rx#:[id number]   


 


  Oral   180


 


Other:   


 


  Voiding Method  Diaper 


 


  Weight 81.647 kg  81.647 kg











 GENERAL EXAM: Alert, confused 76-year-old white male, only oriented to self, 

resting comfortably, currently on 2 L of oxygen and a pulse ox of 96%, patient 

is a poor historian related to his underlying history of dementia, but appears 

comfortable in no apparent distress.


HEAD: Normocephalic/atraumatic.


EYES: Normal reaction of pupils, equal size.  Conjunctiva pink, sclera white.


NOSE: Clear with pink turbinates.


THROAT: No erythema or exudates.


NECK: No masses, no JVD, no thyroid enlargement, no adenopathy.


CHEST: No chest wall deformity.  Symmetrical expansion. 


LUNGS: Equal air entry with diffuse scattered crackles


CVS: Regular rate and rhythm, normal S1 and S2, no gallops, no murmurs, no rubs


ABDOMEN: Soft, nontender.  No hepatosplenomegaly, normal bowel sounds, no 

guarding or rigidity.


EXTREMITIES: No clubbing, no edema, no cyanosis, 2+ pulses and upper and lower 

extremities.


MUSCULOSKELETAL: Muscle strength and tone normal.


SPINE: No scoliosis or deformity


SKIN: No rashes


CENTRAL NERVOUS SYSTEM: Alert and oriented -3.  No focal deficits, tone is 

normal in all 4 extremities.


PSYCHIATRIC: Alert and oriented -3.  Appropriate affect.  Intact judgment and 

insight.











Results





- Laboratory Findings


CBC and BMP: 


                                 22 08:51





                                 22 08:51


PT/INR, D-dimer











D-Dimer  0.97 mg/L FEU (<0.60)  H  22  08:51    








Abnormal lab findings: 


                                  Abnormal Labs











  22





  20:30 20:30 20:30


 


WBC  11.7 H  


 


Neutrophils #  10.0 H  


 


Lymphocytes #  0.4 L  


 


Fibrinogen   


 


D-Dimer   


 


Sodium   133 L 


 


Carbon Dioxide   20 L 


 


BUN   34 H 


 


Creatinine   1.35 H 


 


Glucose   281 H 


 


POC Glucose (mg/dL)   


 


Calcium   7.9 L 


 


Magnesium   


 


Ferritin   


 


Lactate Dehydrogenase   


 


C-Reactive Protein   


 


Total Protein   5.8 L 


 


Albumin   3.0 L 


 


Procalcitonin   


 


Coronavirus (PCR)    Detected A














  22





  00:35 00:40 00:40


 


WBC   


 


Neutrophils #   


 


Lymphocytes #   


 


Fibrinogen   844 H 


 


D-Dimer   1.55 H 


 


Sodium   


 


Carbon Dioxide   


 


BUN   


 


Creatinine   


 


Glucose   


 


POC Glucose (mg/dL)  249 H  


 


Calcium   


 


Magnesium   


 


Ferritin    752.0 H


 


Lactate Dehydrogenase    937 H


 


C-Reactive Protein    20.8 H


 


Total Protein   


 


Albumin   


 


Procalcitonin   


 


Coronavirus (PCR)   














  22





  00:40 07:16 08:51


 


WBC   


 


Neutrophils #   


 


Lymphocytes #   


 


Fibrinogen   


 


D-Dimer   


 


Sodium    136 L


 


Carbon Dioxide    21 L


 


BUN    34 H


 


Creatinine    1.34 H


 


Glucose    287 H


 


POC Glucose (mg/dL)   237 H 


 


Calcium    8.1 L


 


Magnesium    2.4 H


 


Ferritin   


 


Lactate Dehydrogenase   


 


C-Reactive Protein    20.5 H


 


Total Protein    5.6 L


 


Albumin    2.7 L


 


Procalcitonin  0.55 H  


 


Coronavirus (PCR)   














  22





  08:51 08:51 11:32


 


WBC   


 


Neutrophils #   


 


Lymphocytes #   0.5 L 


 


Fibrinogen  848 H  


 


D-Dimer  0.97 H  


 


Sodium   


 


Carbon Dioxide   


 


BUN   


 


Creatinine   


 


Glucose   


 


POC Glucose (mg/dL)    255 H


 


Calcium   


 


Magnesium   


 


Ferritin   


 


Lactate Dehydrogenase   


 


C-Reactive Protein   


 


Total Protein   


 


Albumin   


 


Procalcitonin   


 


Coronavirus (PCR)   














- Diagnostic Findings


Chest x-ray: report reviewed, image reviewed





Assessment and Plan


Plan: 


 Assessment:





#1.  Acute hypoxic respiratory failure related to acute COVID-19 infection, 

chest x-ray showing no acute pulmonary process.  Patient presented to the 

hospital on 2022, he tested positive for COVID-19 on 2022, patient 

is not vaccinated against COVID-19.  He is outside the window for Remdesivir, he

 is currently on Decadron, lactic Lovenox, multivitamins, and supportive 

treatment





#2.  Elevated pro-calcitonin level, rule out possibility of bacterial infection,

 chest x-ray showing no acute process





#3.  Dehydration, acute kidney injury, patient is receiving gentle IV hydration





#4.  Elevated inflammatory markers related to acute COVID-19 infection





#5.  Hypertension





#6. Coronary artery disease with previous history of bypass grafting





#7.  Diabetes mellitus type 2





#8.  Hyperlipidemia





#9.  History of peripheral vascular disease with previous right iliac stent 

placement





#10.  History of depression





#11.  Former smoker





#12.  Dementia





Plan:





Continue current medical treatment


Chest x-ray and labs have been reviewed


Patient is breathing comfortably, he is on 2 L of oxygen,


Continue Decadron, continue prophylactic Lovenox


Continue IV hydration


Procalcitonin is elevated


Blood cultures sent


will send urinalysis, with culture


Chest x-ray showing no acute pulmonary process


We'll continue supportive treatment


He is outside the window for Remdesivir


Obtain lower extremity Dopplers to rule out possibility of DVT


Continue monitoring inflammatory markers, and d-dimer levels








I performed a history & physical examination of the patient and discussed their 

management with my nurse practitioner, Xiomara Goetz.  I reviewed the nurse 

practitioner's note and agree with the documented findings and plan of care.  

Lung sounds are positive for diffuse crackles throughout the lung fields.  The 

findings and the impression was discussed with the patient.  I attest to the 

documentation by the nurse practitioner. 








Time with Patient: Greater than 30

## 2022-01-21 NOTE — US
EXAMINATION TYPE: US venous doppler duplex LE 

 

DATE OF EXAM: 1/21/2022 1:58 PM

 

COMPARISON: NONE

 

CLINICAL HISTORY: elevated d-dimer. elevated d-dimer

 

SIDE PERFORMED: bilateral   

 

TECHNIQUE:  The lower extremity deep venous system is examined utilizing real time linear array sonog
pedro with graded compression, doppler sonography and color-flow sonography.

 

VESSELS IMAGED:

Common Femoral Vein

Deep Femoral Vein

Greater Saphenous Vein *

Femoral Vein

Popliteal Vein

Small Saphenous Vein *

Proximal Calf Veins

(* superficial vessels)

 

 

 

Right Leg:  no evidence of DVT. rouleaux flow noted 

 

Left Leg:  no evidence of DVT. rouleaux flow noted 

 

Grayscale, color doppler, spectral doppler imaging performed of the deep veins of the bilateral lower
 extremities.  There is normal visualized color flow and compressibility.

 

IMPRESSION:  No ultrasound evidence for acute DVT in either lower extremity.

## 2022-01-22 LAB
ALBUMIN SERPL-MCNC: 2.6 G/DL (ref 3.8–4.9)
ALBUMIN/GLOB SERPL: 1.13 G/DL (ref 1.6–3.17)
ALP SERPL-CCNC: 100 U/L (ref 41–126)
ALT SERPL-CCNC: 20 U/L (ref 10–49)
ANION GAP SERPL CALC-SCNC: 12.5 MMOL/L (ref 10–18)
AST SERPL-CCNC: 22 U/L (ref 14–35)
BASOPHILS # BLD AUTO: 0.02 X 10*3/UL (ref 0–0.1)
BASOPHILS NFR BLD AUTO: 0.1 %
BUN SERPL-SCNC: 30.4 MG/DL (ref 9–27)
BUN/CREAT SERPL: 27.64 RATIO (ref 12–20)
CALCIUM SPEC-MCNC: 8.1 MG/DL (ref 8.7–10.3)
CHLORIDE SERPL-SCNC: 106 MMOL/L (ref 96–109)
CO2 SERPL-SCNC: 17.5 MMOL/L (ref 20–27.5)
EOSINOPHIL # BLD AUTO: 0 X 10*3/UL (ref 0.04–0.35)
EOSINOPHIL NFR BLD AUTO: 0 %
ERYTHROCYTE [DISTWIDTH] IN BLOOD BY AUTOMATED COUNT: 4.21 X 10*6/UL (ref 4.4–5.6)
ERYTHROCYTE [DISTWIDTH] IN BLOOD: 14.3 % (ref 11.5–14.5)
FERRITIN SERPL-MCNC: 744 NG/ML (ref 22–322)
FIBRINOGEN PPP-MCNC: 651 MG/DL (ref 200–500)
GLOBULIN SER CALC-MCNC: 2.3 G/DL (ref 1.6–3.3)
GLUCOSE BLD-MCNC: 200 MG/DL (ref 75–99)
GLUCOSE BLD-MCNC: 233 MG/DL (ref 75–99)
GLUCOSE BLD-MCNC: 294 MG/DL (ref 75–99)
GLUCOSE BLD-MCNC: 297 MG/DL (ref 75–99)
GLUCOSE BLD-MCNC: 324 MG/DL (ref 75–99)
GLUCOSE SERPL-MCNC: 330 MG/DL (ref 70–110)
HCT VFR BLD AUTO: 36.4 % (ref 39.6–50)
HGB BLD-MCNC: 11.5 G/DL (ref 13–17)
LYMPHOCYTES # SPEC AUTO: 0.4 X 10*3/UL (ref 0.9–5)
LYMPHOCYTES NFR SPEC AUTO: 2.4 %
MAGNESIUM SPEC-SCNC: 2.1 MG/DL (ref 1.5–2.4)
MCH RBC QN AUTO: 27.3 PG (ref 27–32)
MCHC RBC AUTO-ENTMCNC: 31.6 G/DL (ref 32–37)
MCV RBC AUTO: 86.5 FL (ref 80–97)
MONOCYTES # BLD AUTO: 1.16 X 10*3/UL (ref 0.2–1)
MONOCYTES NFR BLD AUTO: 6.8 %
NEUTROPHILS # BLD AUTO: 15.23 X 10*3/UL (ref 1.8–7.7)
NEUTROPHILS NFR BLD AUTO: 89.5 %
PLATELET # BLD AUTO: 391 X 10*3/UL (ref 140–440)
POTASSIUM SERPL-SCNC: 4.9 MMOL/L (ref 3.5–5.5)
PROT SERPL-MCNC: 4.9 G/DL (ref 6.2–8.2)
SODIUM SERPL-SCNC: 136 MMOL/L (ref 135–145)
WBC # BLD AUTO: 17.01 X 10*3/UL (ref 4.5–10)

## 2022-01-22 RX ADMIN — INSULIN ASPART SCH UNIT: 100 INJECTION, SOLUTION INTRAVENOUS; SUBCUTANEOUS at 08:16

## 2022-01-22 RX ADMIN — CEFAZOLIN SCH MLS/HR: 330 INJECTION, POWDER, FOR SOLUTION INTRAMUSCULAR; INTRAVENOUS at 17:09

## 2022-01-22 RX ADMIN — OXYCODONE HYDROCHLORIDE AND ACETAMINOPHEN SCH MG: 500 TABLET ORAL at 08:17

## 2022-01-22 RX ADMIN — INSULIN ASPART SCH UNIT: 100 INJECTION, SOLUTION INTRAVENOUS; SUBCUTANEOUS at 12:19

## 2022-01-22 RX ADMIN — INSULIN DETEMIR SCH UNIT: 100 INJECTION, SOLUTION SUBCUTANEOUS at 08:16

## 2022-01-22 RX ADMIN — ASPIRIN 81 MG CHEWABLE TABLET SCH MG: 81 TABLET CHEWABLE at 08:17

## 2022-01-22 RX ADMIN — DONEPEZIL HYDROCHLORIDE SCH MG: 5 TABLET ORAL at 08:17

## 2022-01-22 RX ADMIN — ATORVASTATIN CALCIUM SCH MG: 40 TABLET, FILM COATED ORAL at 21:05

## 2022-01-22 RX ADMIN — INSULIN ASPART SCH UNIT: 100 INJECTION, SOLUTION INTRAVENOUS; SUBCUTANEOUS at 17:10

## 2022-01-22 RX ADMIN — CLOPIDOGREL BISULFATE SCH MG: 75 TABLET ORAL at 08:17

## 2022-01-22 RX ADMIN — CEFAZOLIN SCH MLS/HR: 330 INJECTION, POWDER, FOR SOLUTION INTRAMUSCULAR; INTRAVENOUS at 02:36

## 2022-01-22 RX ADMIN — Medication SCH MG: at 08:17

## 2022-01-22 RX ADMIN — INSULIN ASPART SCH UNIT: 100 INJECTION, SOLUTION INTRAVENOUS; SUBCUTANEOUS at 21:05

## 2022-01-22 RX ADMIN — ENOXAPARIN SODIUM SCH MG: 40 INJECTION SUBCUTANEOUS at 00:03

## 2022-01-22 RX ADMIN — CHOLECALCIFEROL TAB 125 MCG (5000 UNIT) SCH MCG: 125 TAB at 08:17

## 2022-01-22 RX ADMIN — MEMANTINE HYDROCHLORIDE SCH MG: 10 TABLET ORAL at 21:05

## 2022-01-22 RX ADMIN — MEMANTINE HYDROCHLORIDE SCH MG: 10 TABLET ORAL at 08:17

## 2022-01-22 NOTE — P.PN
Subjective


Progress Note Date: 01/22/22


Principal diagnosis: 


Patient appears to be more awake and alert today


hysical examination:


General: non toxic, no distress, appears at stated age, normal weight


Derm: no unusual rashes/lesions no unusual ecchymoses, warm, dry


Head: atraumatic, normocephalic, symmetric


Eyes: EOMI, no lid lag, anicteric sclera, pupils equal round reactive to light


ENT: Nose and ears atraumatic, no thrush,  no pharyngeal erythema


Neck: No thyromegaly, no cervical lymphadenopathy, trachea midline, supple


Mouth: no lip lesion, mucus membranes moist


Cardiovascular: S1S2 reg, no murmur, positive posterior tibial pulse bilateral, 

no edema, capillary refill less than 2 seconds


Lungs: CTA bilateral, no rhonchi, no rales , no accessory muscle use


Abdominal: soft,  nontender to palpation, no guarding, no appreciable 

organomegaly, normal bowel sounds


Ext: no gross muscle atrophy,  muscle strength 3 out of 5 in all 4 extremities 

grossly, no contractures, 


Neuro:  CN II-XI grossly intact, light touch intact all 4 extremities, finger to

nose within normal limits,


Psych: Awake, confused, not answering questions appropriately, oriented only to 

person and partially to place 





Assessment/plan





Acute hypoxic respiratory failure secondary to COVID-19 pneumonitis


We'll continue to wean oxygen


Patient needs to stay in the hospital for at least another day or 2 continue 

monitoring


-Continue supplemental oxygen


-Pulmonary consult


-Zinc, melatonin, vitamin C, vitamin D


-Monitor inflammatory markers





Acute kidney injury, likely secondary to dehydration


-Continue with IV fluids





Chronic conditions: Type 2 DM, Hypertension, hyperlipidemia


-Lispro insulin sliding scale blood glucose monitoring


-Check A1c


-Continue with home medications





DVT prophylaxis


-Lovenox





The patient is admitted with an anticipated greater than 2 midnight stay for 

evaluation of COVID


CODE STATUS:Full Code


Discussed with: Patient


Anticipated discharge date: Unknown


Anticipated discharge place: Home





Objective





- Vital Signs


Vital signs: 


                                   Vital Signs











Temp  97.6 F   01/22/22 05:06


 


Pulse  67   01/22/22 05:06


 


Resp  14   01/22/22 05:06


 


BP  161/69   01/22/22 05:06


 


Pulse Ox  98   01/22/22 05:06








                                 Intake & Output











 01/21/22 01/22/22 01/22/22





 18:59 06:59 18:59


 


Intake Total 960 180 


 


Balance 960 180 


 


Weight 81.647 kg  


 


Intake:   


 


  Intake, IV Titration 600  





  Amount   


 


    Sodium Chloride 0.9% 1, 600  





    000 ml @ 75 mls/hr IV .   





    [id number] [facility] Rx#:[id number]   


 


  Oral 360 180 


 


Other:   


 


  Voiding Method  Diaper Diaper


 


  # Voids  1 














- Labs


CBC & Chem 7: 


                                 01/21/22 08:51





                                 01/21/22 08:51


Labs: 


                  Abnormal Lab Results - Last 24 Hours (Table)











  01/21/22 01/21/22 01/21/22 Range/Units





  00:40 00:40 08:51 


 


Lymphocytes #     (1.0-4.8)  k/uL


 


Fibrinogen     (200-500)  mg/dL


 


D-Dimer     (<0.60)  mg/L FEU


 


Sodium    136 L  (137-145)  mmol/L


 


Carbon Dioxide    21 L  (22-30)  mmol/L


 


BUN    34 H  (9-20)  mg/dL


 


Creatinine    1.34 H  (0.66-1.25)  mg/dL


 


Glucose    287 H  (74-99)  mg/dL


 


POC Glucose (mg/dL)     (75-99)  mg/dL


 


Hemoglobin A1c     (0.0-6.0)  %


 


Calcium    8.1 L  (8.4-10.2)  mg/dL


 


Magnesium    2.4 H  (1.6-2.3)  mg/dL


 


Ferritin  752.0 H   863.0 H  (22.0-322.0)  ng/mL


 


C-Reactive Protein    20.5 H  (<1.0)  mg/dL


 


Total Protein    5.6 L  (6.3-8.2)  g/dL


 


Albumin    2.7 L  (3.5-5.0)  g/dL


 


Procalcitonin   0.55 H   (0.02-0.09)  ng/mL


 


Urine Protein     (Negative)  


 


Urine Glucose (UA)     (Negative)  


 


Hyaline Casts     (0-2)  /lpf


 


Urine Mucus     (None)  /hpf














  01/21/22 01/21/22 01/21/22 Range/Units





  08:51 08:51 08:51 


 


Lymphocytes #   0.5 L   (1.0-4.8)  k/uL


 


Fibrinogen  848 H    (200-500)  mg/dL


 


D-Dimer  0.97 H    (<0.60)  mg/L FEU


 


Sodium     (137-145)  mmol/L


 


Carbon Dioxide     (22-30)  mmol/L


 


BUN     (9-20)  mg/dL


 


Creatinine     (0.66-1.25)  mg/dL


 


Glucose     (74-99)  mg/dL


 


POC Glucose (mg/dL)     (75-99)  mg/dL


 


Hemoglobin A1c    9.9 H  (0.0-6.0)  %


 


Calcium     (8.4-10.2)  mg/dL


 


Magnesium     (1.6-2.3)  mg/dL


 


Ferritin     (22.0-322.0)  ng/mL


 


C-Reactive Protein     (<1.0)  mg/dL


 


Total Protein     (6.3-8.2)  g/dL


 


Albumin     (3.5-5.0)  g/dL


 


Procalcitonin     (0.02-0.09)  ng/mL


 


Urine Protein     (Negative)  


 


Urine Glucose (UA)     (Negative)  


 


Hyaline Casts     (0-2)  /lpf


 


Urine Mucus     (None)  /hpf














  01/21/22 01/21/22 01/21/22 Range/Units





  11:32 17:02 18:35 


 


Lymphocytes #     (1.0-4.8)  k/uL


 


Fibrinogen     (200-500)  mg/dL


 


D-Dimer     (<0.60)  mg/L FEU


 


Sodium     (137-145)  mmol/L


 


Carbon Dioxide     (22-30)  mmol/L


 


BUN     (9-20)  mg/dL


 


Creatinine     (0.66-1.25)  mg/dL


 


Glucose     (74-99)  mg/dL


 


POC Glucose (mg/dL)  255 H  330 H   (75-99)  mg/dL


 


Hemoglobin A1c     (0.0-6.0)  %


 


Calcium     (8.4-10.2)  mg/dL


 


Magnesium     (1.6-2.3)  mg/dL


 


Ferritin     (22.0-322.0)  ng/mL


 


C-Reactive Protein     (<1.0)  mg/dL


 


Total Protein     (6.3-8.2)  g/dL


 


Albumin     (3.5-5.0)  g/dL


 


Procalcitonin     (0.02-0.09)  ng/mL


 


Urine Protein    2+ H  (Negative)  


 


Urine Glucose (UA)    4+ H  (Negative)  


 


Hyaline Casts    4 H  (0-2)  /lpf


 


Urine Mucus    Rare H  (None)  /hpf














  01/21/22 01/22/22 01/22/22 Range/Units





  19:48 02:34 07:04 


 


Lymphocytes #     (1.0-4.8)  k/uL


 


Fibrinogen     (200-500)  mg/dL


 


D-Dimer     (<0.60)  mg/L FEU


 


Sodium     (137-145)  mmol/L


 


Carbon Dioxide     (22-30)  mmol/L


 


BUN     (9-20)  mg/dL


 


Creatinine     (0.66-1.25)  mg/dL


 


Glucose     (74-99)  mg/dL


 


POC Glucose (mg/dL)  385 H  297 H  294 H  (75-99)  mg/dL


 


Hemoglobin A1c     (0.0-6.0)  %


 


Calcium     (8.4-10.2)  mg/dL


 


Magnesium     (1.6-2.3)  mg/dL


 


Ferritin     (22.0-322.0)  ng/mL


 


C-Reactive Protein     (<1.0)  mg/dL


 


Total Protein     (6.3-8.2)  g/dL


 


Albumin     (3.5-5.0)  g/dL


 


Procalcitonin     (0.02-0.09)  ng/mL


 


Urine Protein     (Negative)  


 


Urine Glucose (UA)     (Negative)  


 


Hyaline Casts     (0-2)  /lpf


 


Urine Mucus     (None)  /hpf








                      Microbiology - Last 24 Hours (Table)











 01/20/22 20:25 Blood Culture - Preliminary





 Blood    No Growth after 24 hours

## 2022-01-22 NOTE — P.PN
Subjective


Progress Note Date: 01/22/22





 This 76-year-old white male patient with advanced dementia, who is a very poor 

historian, and prior history of cardiac disease, hyperlipidemia, hypertension, 

previous history of coronary artery bypass grafting surgery, diabetes mellitus, 

hyperlipidemia, former smoker, peripheral vascular disease with prior history of

right iliac stent placement, and depression who was brought into the hospital on

01/20/2022 for evaluation of shortness of breath according to the ED 

documentation.  Patient is very confused, he is not able to tell me what brought

him to the hospital.  However does not appear to be in any acute distress, he 

states his wife brought him in.  He is only oriented 1, he did not know he was 

in the hospital, he is not able to tell me the exact date.  He seems to have 

delayed verbal response to questioning, but he is awake, he is trying to feed 

himself.  He denies being short of breath, he states that he does cough all the 

time.  Denies any chest discomfort.  Is currently on 2 L of oxygen his pulse ox 

is 96%, according to the ED documentation patient was complaining of ongoing 

worsening shortness of breath, fever chills, generalized weakness over the past 

2 weeks, according to the wife patient was diagnosed with COVID-19 on 

01/13/2022, he is non-vaccinated for COVID-19, his breathing became much worse, 

he was very weak and unable to walk, she was not eating well.  Chest x-ray in 

emergency department showed chronic changes without acute pulmonary process.  

COVID-19 PCR was positive, influenza A and B PCR were negative, his white count 

was 11.7, hemoglobin was 13.0, fibrinogen level was 844, d-dimer was 1.5, sodium

is 133, potassium is 4.7, CO2 is 20, BUN is 34 creatinine is 1.35, lactic acid 

was 1.3.  LFTs are within normal limits, LDH was 937, CRP was 20.8, troponin was

less than 0.012, pro calcitonin level was 0.55.  Patient is outside the window 

for Remdesivir, he was started on Decadron 6 mg daily, he is on prophylactic 

Lovenox 40 mg daily, he was given gentle IV hydration with 500 mL IV fluid bolus

in the emergency department, his maintenance IV fluids infusing at rate of 75 ML

per hour.  Blood culture has been sent and is pending.  Patient did have a low-g

rade fever overnight with a temp of 100.9F.  He is afebrile this morning, his 

appetite is clean, he is feeding himself.  Lung sounds reveal scattered 

crackles, no rhonchi, no wheezing, patient denies any chest pain, abdomen is 

soft, denies any nausea, no diarrhea.





The patient is seen today 01/22/2022 in follow-up on the regular medical floor. 

He is currently sitting up in bed.  Awake and alert in no acute distress.  He is

somewhat disoriented to time and place.  He does have a history of dementia that

has been on the decline.  The plan is for transfer to subacute rehabilitation on

Monday.  Presently he is 98% O2 saturation on 2 L however he does drop to 84% on

room air.  He is continued on Decadron, Lovenox, vitamin supplements.  Dopplers 

of the lower extremities revealed no evidence of DVT.  White count 17.0.  

Hemoglobin 11.5.  D-dimer 0.73.  Sodium 136.  Potassium 4.9.  Creatinine 1.1.  

Glucose 3:30.  AST 22.  ALT 20.





Objective





- Vital Signs


Vital signs: 


                                   Vital Signs











Temp  97.9 F   01/22/22 08:46


 


Pulse  63   01/22/22 08:46


 


Resp  17   01/22/22 08:46


 


BP  157/70   01/22/22 08:46


 


Pulse Ox  91 L  01/22/22 08:46








                                 Intake & Output











 01/21/22 01/22/22 01/22/22





 18:59 06:59 18:59


 


Intake Total 960 180 


 


Balance 960 180 


 


Weight 81.647 kg  81.647 kg


 


Intake:   


 


  Intake, IV Titration 600  





  Amount   


 


    Sodium Chloride 0.9% 1, 600  





    000 ml @ 75 mls/hr IV .   





    [id number] [facility] Rx#:[id number]   


 


  Oral 360 180 


 


Other:   


 


  Voiding Method  Diaper Diaper


 


  # Voids  1 














- Exam





 GENERAL EXAM: Alert, confused 76-year-old patient, only oriented to self, 

resting comfortably, currently on 2 L of oxygen and a pulse ox of 98%, patient 

is a poor historian related to his underlying history of dementia, but appears 

comfortable in no apparent distress.


HEAD: Normocephalic/atraumatic.


EYES: Normal reaction of pupils, equal size.  Conjunctiva pink, sclera white.


NOSE: Clear with pink turbinates.


THROAT: No erythema or exudates.


NECK: No masses, no JVD, no thyroid enlargement, no adenopathy.


CHEST: No chest wall deformity.  Symmetrical expansion. 


LUNGS: Equal air entry with diffuse scattered crackles


CVS: Regular rate and rhythm, normal S1 and S2, no gallops, no murmurs, no rubs


ABDOMEN: Soft, nontender.  No hepatosplenomegaly, normal bowel sounds, no 

guarding or rigidity.


EXTREMITIES: No clubbing, no edema, no cyanosis, 2+ pulses and upper and lower 

extremities.


MUSCULOSKELETAL: Muscle strength and tone normal.


SPINE: No scoliosis or deformity


SKIN: No rashes


CENTRAL NERVOUS SYSTEM: No focal deficits, tone is normal in all 4 extremities.


PSYCHIATRIC: Alert and oriented -1.  Appropriate affect. 





- Labs


CBC & Chem 7: 


                                 01/22/22 08:08





                                 01/22/22 08:08


Labs: 


                  Abnormal Lab Results - Last 24 Hours (Table)











  01/21/22 01/21/22 01/21/22 Range/Units





  08:51 08:51 17:02 


 


WBC     (4.50-10.00)  X 10*3/uL


 


RBC     (4.40-5.60)  X 10*6/uL


 


Hgb     (13.0-17.0)  g/dL


 


Hct     (39.6-50.0)  %


 


MCHC     (32.0-37.0)  g/dL


 


Immature Gran #     (0.00-0.04)  X 10*3/uL


 


Neutrophils #     (1.80-7.70)  X 10*3/uL


 


Lymphocytes #     (0.90-5.00)  X 10*3/uL


 


Monocytes #     (0.20-1.00)  X 10*3/uL


 


Eosinophils #     (0.04-0.35)  X 10*3/uL


 


Fibrinogen     (200-500)  mg/dL


 


D-Dimer     (<0.60)  mg/L FEU


 


Carbon Dioxide     (20.0-27.5)  mmol/L


 


BUN     (9.0-27.0)  mg/dL


 


BUN/Creatinine Ratio     (12.00-20.00)  Ratio


 


Glucose     ()  mg/dL


 


POC Glucose (mg/dL)    330 H  (75-99)  mg/dL


 


Hemoglobin A1c   9.9 H   (0.0-6.0)  %


 


Calcium     (8.7-10.3)  mg/dL


 


Ferritin  863.0 H    (22.0-322.0)  ng/mL


 


C-Reactive Protein     (0.00-0.80)  mg/dL


 


Total Protein     (6.2-8.2)  g/dL


 


Albumin     (3.8-4.9)  g/dL


 


Albumin/Globulin Ratio     (1.60-3.17)  g/dL


 


Urine Protein     (Negative)  


 


Urine Glucose (UA)     (Negative)  


 


Hyaline Casts     (0-2)  /lpf


 


Urine Mucus     (None)  /hpf














  01/21/22 01/21/22 01/22/22 Range/Units





  18:35 19:48 02:34 


 


WBC     (4.50-10.00)  X 10*3/uL


 


RBC     (4.40-5.60)  X 10*6/uL


 


Hgb     (13.0-17.0)  g/dL


 


Hct     (39.6-50.0)  %


 


MCHC     (32.0-37.0)  g/dL


 


Immature Gran #     (0.00-0.04)  X 10*3/uL


 


Neutrophils #     (1.80-7.70)  X 10*3/uL


 


Lymphocytes #     (0.90-5.00)  X 10*3/uL


 


Monocytes #     (0.20-1.00)  X 10*3/uL


 


Eosinophils #     (0.04-0.35)  X 10*3/uL


 


Fibrinogen     (200-500)  mg/dL


 


D-Dimer     (<0.60)  mg/L FEU


 


Carbon Dioxide     (20.0-27.5)  mmol/L


 


BUN     (9.0-27.0)  mg/dL


 


BUN/Creatinine Ratio     (12.00-20.00)  Ratio


 


Glucose     ()  mg/dL


 


POC Glucose (mg/dL)   385 H  297 H  (75-99)  mg/dL


 


Hemoglobin A1c     (0.0-6.0)  %


 


Calcium     (8.7-10.3)  mg/dL


 


Ferritin     (22.0-322.0)  ng/mL


 


C-Reactive Protein     (0.00-0.80)  mg/dL


 


Total Protein     (6.2-8.2)  g/dL


 


Albumin     (3.8-4.9)  g/dL


 


Albumin/Globulin Ratio     (1.60-3.17)  g/dL


 


Urine Protein  2+ H    (Negative)  


 


Urine Glucose (UA)  4+ H    (Negative)  


 


Hyaline Casts  4 H    (0-2)  /lpf


 


Urine Mucus  Rare H    (None)  /hpf














  01/22/22 01/22/22 01/22/22 Range/Units





  07:04 08:08 08:08 


 


WBC   17.01 H   (4.50-10.00)  X 10*3/uL


 


RBC   4.21 L   (4.40-5.60)  X 10*6/uL


 


Hgb   11.5 L   (13.0-17.0)  g/dL


 


Hct   36.4 L   (39.6-50.0)  %


 


MCHC   31.6 L   (32.0-37.0)  g/dL


 


Immature Gran #   0.20 H   (0.00-0.04)  X 10*3/uL


 


Neutrophils #   15.23 H   (1.80-7.70)  X 10*3/uL


 


Lymphocytes #   0.40 L   (0.90-5.00)  X 10*3/uL


 


Monocytes #   1.16 H   (0.20-1.00)  X 10*3/uL


 


Eosinophils #   0 L   (0.04-0.35)  X 10*3/uL


 


Fibrinogen    651 H  (200-500)  mg/dL


 


D-Dimer    0.73 H  (<0.60)  mg/L FEU


 


Carbon Dioxide     (20.0-27.5)  mmol/L


 


BUN     (9.0-27.0)  mg/dL


 


BUN/Creatinine Ratio     (12.00-20.00)  Ratio


 


Glucose     ()  mg/dL


 


POC Glucose (mg/dL)  294 H    (75-99)  mg/dL


 


Hemoglobin A1c     (0.0-6.0)  %


 


Calcium     (8.7-10.3)  mg/dL


 


Ferritin     (22.0-322.0)  ng/mL


 


C-Reactive Protein     (0.00-0.80)  mg/dL


 


Total Protein     (6.2-8.2)  g/dL


 


Albumin     (3.8-4.9)  g/dL


 


Albumin/Globulin Ratio     (1.60-3.17)  g/dL


 


Urine Protein     (Negative)  


 


Urine Glucose (UA)     (Negative)  


 


Hyaline Casts     (0-2)  /lpf


 


Urine Mucus     (None)  /hpf














  01/22/22 01/22/22 Range/Units





  08:08 11:31 


 


WBC    (4.50-10.00)  X 10*3/uL


 


RBC    (4.40-5.60)  X 10*6/uL


 


Hgb    (13.0-17.0)  g/dL


 


Hct    (39.6-50.0)  %


 


MCHC    (32.0-37.0)  g/dL


 


Immature Gran #    (0.00-0.04)  X 10*3/uL


 


Neutrophils #    (1.80-7.70)  X 10*3/uL


 


Lymphocytes #    (0.90-5.00)  X 10*3/uL


 


Monocytes #    (0.20-1.00)  X 10*3/uL


 


Eosinophils #    (0.04-0.35)  X 10*3/uL


 


Fibrinogen    (200-500)  mg/dL


 


D-Dimer    (<0.60)  mg/L FEU


 


Carbon Dioxide  17.5 L   (20.0-27.5)  mmol/L


 


BUN  30.4 H   (9.0-27.0)  mg/dL


 


BUN/Creatinine Ratio  27.64 H   (12.00-20.00)  Ratio


 


Glucose  330 H   ()  mg/dL


 


POC Glucose (mg/dL)   324 H  (75-99)  mg/dL


 


Hemoglobin A1c    (0.0-6.0)  %


 


Calcium  8.1 L   (8.7-10.3)  mg/dL


 


Ferritin  744.0 H   (22.0-322.0)  ng/mL


 


C-Reactive Protein  7.70 H   (0.00-0.80)  mg/dL


 


Total Protein  4.9 L   (6.2-8.2)  g/dL


 


Albumin  2.6 L   (3.8-4.9)  g/dL


 


Albumin/Globulin Ratio  1.13 L   (1.60-3.17)  g/dL


 


Urine Protein    (Negative)  


 


Urine Glucose (UA)    (Negative)  


 


Hyaline Casts    (0-2)  /lpf


 


Urine Mucus    (None)  /hpf








                      Microbiology - Last 24 Hours (Table)











 01/20/22 20:25 Blood Culture - Preliminary





 Blood    No Growth after 24 hours














Assessment and Plan


Assessment: 





1  Acute hypoxic respiratory failure related to acute COVID-19 infection, chest 

x-ray showing no acute pulmonary process.  Patient presented to the hospital on 

01/20/2022, he tested positive for COVID-19 on 01/13/2022, patient is not 

vaccinated against COVID-19.  He is outside the window for Remdesivir, he is 

currently on Decadron, lactic Lovenox, multivitamins, and supportive treatment





2  Elevated pro-calcitonin level, rule out possibility of bacterial infection, 

chest x-ray showing no acute process





3  Dehydration, acute kidney injury, patient is receiving gentle IV hydration





4  Elevated inflammatory markers related to acute COVID-19 infection





5  Hypertension





6 Coronary artery disease with previous history of bypass grafting





7  Diabetes mellitus type 2





8  Hyperlipidemia





9  History of peripheral vascular disease with previous right iliac stent place

ment





10  History of depression





11  Former smoker





12  Dementia





Plan:





The patient was seen and evaluated


Currently stable from the pulmonary standpoint


On 2 L nasal cannula


Continue Lovenox, Decadron, vitamin supplement


Titrate down the FiO2 as tolerated


We will continue to follow





I, the cosigning physician, performed a history & physical examination of the 

patient. Lungs sounds with few by basilar crackles.  Maintaining good O2 

saturations in the 90s on 2 L/m per nasal cannula.  I discussed the assessment 

and plan of care with my nurse practitioner, Natalia Gutierrez. I attest to the above 

note as dictated by her.

## 2022-01-23 LAB
ALBUMIN SERPL-MCNC: 2.5 G/DL (ref 3.8–4.9)
ALBUMIN/GLOB SERPL: 1.07 G/DL (ref 1.6–3.17)
ALP SERPL-CCNC: 96 U/L (ref 41–126)
ALT SERPL-CCNC: 22 U/L (ref 10–49)
ANION GAP SERPL CALC-SCNC: 11.3 MMOL/L (ref 10–18)
AST SERPL-CCNC: 24 U/L (ref 14–35)
BASOPHILS # BLD AUTO: 0.02 X 10*3/UL (ref 0–0.1)
BASOPHILS NFR BLD AUTO: 0.2 %
BUN SERPL-SCNC: 18.5 MG/DL (ref 9–27)
BUN/CREAT SERPL: 20.13 RATIO (ref 12–20)
CALCIUM SPEC-MCNC: 7.8 MG/DL (ref 8.7–10.3)
CHLORIDE SERPL-SCNC: 106 MMOL/L (ref 96–109)
CO2 SERPL-SCNC: 19 MMOL/L (ref 20–27.5)
EOSINOPHIL # BLD AUTO: 0.02 X 10*3/UL (ref 0.04–0.35)
EOSINOPHIL NFR BLD AUTO: 0.2 %
ERYTHROCYTE [DISTWIDTH] IN BLOOD BY AUTOMATED COUNT: 4.19 X 10*6/UL (ref 4.4–5.6)
ERYTHROCYTE [DISTWIDTH] IN BLOOD: 14.4 % (ref 11.5–14.5)
FERRITIN SERPL-MCNC: 575 NG/ML (ref 22–322)
FIBRINOGEN PPP-MCNC: 656 MG/DL (ref 200–500)
GLOBULIN SER CALC-MCNC: 2.3 G/DL (ref 1.6–3.3)
GLUCOSE BLD-MCNC: 165 MG/DL (ref 75–99)
GLUCOSE BLD-MCNC: 193 MG/DL (ref 75–99)
GLUCOSE BLD-MCNC: 208 MG/DL (ref 75–99)
GLUCOSE BLD-MCNC: 210 MG/DL (ref 75–99)
GLUCOSE BLD-MCNC: 273 MG/DL (ref 75–99)
GLUCOSE SERPL-MCNC: 142 MG/DL (ref 70–110)
HCT VFR BLD AUTO: 36.5 % (ref 39.6–50)
HGB BLD-MCNC: 11.6 G/DL (ref 13–17)
LYMPHOCYTES # SPEC AUTO: 0.72 X 10*3/UL (ref 0.9–5)
LYMPHOCYTES NFR SPEC AUTO: 6 %
MAGNESIUM SPEC-SCNC: 1.9 MG/DL (ref 1.5–2.4)
MCH RBC QN AUTO: 27.7 PG (ref 27–32)
MCHC RBC AUTO-ENTMCNC: 31.8 G/DL (ref 32–37)
MCV RBC AUTO: 87.1 FL (ref 80–97)
MONOCYTES # BLD AUTO: 1.09 X 10*3/UL (ref 0.2–1)
MONOCYTES NFR BLD AUTO: 9 %
NEUTROPHILS # BLD AUTO: 10.05 X 10*3/UL (ref 1.8–7.7)
NEUTROPHILS NFR BLD AUTO: 83.4 %
PLATELET # BLD AUTO: 387 X 10*3/UL (ref 140–440)
POTASSIUM SERPL-SCNC: 4.3 MMOL/L (ref 3.5–5.5)
PROT SERPL-MCNC: 4.9 G/DL (ref 6.2–8.2)
SODIUM SERPL-SCNC: 136 MMOL/L (ref 135–145)
WBC # BLD AUTO: 12.05 X 10*3/UL (ref 4.5–10)

## 2022-01-23 RX ADMIN — INSULIN ASPART SCH UNIT: 100 INJECTION, SOLUTION INTRAVENOUS; SUBCUTANEOUS at 18:23

## 2022-01-23 RX ADMIN — ATORVASTATIN CALCIUM SCH MG: 40 TABLET, FILM COATED ORAL at 21:16

## 2022-01-23 RX ADMIN — MEMANTINE HYDROCHLORIDE SCH MG: 10 TABLET ORAL at 21:16

## 2022-01-23 RX ADMIN — ENOXAPARIN SODIUM SCH MG: 40 INJECTION SUBCUTANEOUS at 00:09

## 2022-01-23 RX ADMIN — INSULIN ASPART SCH UNIT: 100 INJECTION, SOLUTION INTRAVENOUS; SUBCUTANEOUS at 12:34

## 2022-01-23 RX ADMIN — INSULIN DETEMIR SCH UNIT: 100 INJECTION, SOLUTION SUBCUTANEOUS at 09:05

## 2022-01-23 RX ADMIN — OXYCODONE HYDROCHLORIDE AND ACETAMINOPHEN SCH MG: 500 TABLET ORAL at 09:07

## 2022-01-23 RX ADMIN — ASPIRIN 81 MG CHEWABLE TABLET SCH MG: 81 TABLET CHEWABLE at 09:07

## 2022-01-23 RX ADMIN — CHOLECALCIFEROL TAB 125 MCG (5000 UNIT) SCH MCG: 125 TAB at 09:07

## 2022-01-23 RX ADMIN — CEFAZOLIN SCH MLS/HR: 330 INJECTION, POWDER, FOR SOLUTION INTRAMUSCULAR; INTRAVENOUS at 07:50

## 2022-01-23 RX ADMIN — DONEPEZIL HYDROCHLORIDE SCH MG: 5 TABLET ORAL at 09:05

## 2022-01-23 RX ADMIN — INSULIN ASPART SCH UNIT: 100 INJECTION, SOLUTION INTRAVENOUS; SUBCUTANEOUS at 21:16

## 2022-01-23 RX ADMIN — Medication SCH MG: at 09:07

## 2022-01-23 RX ADMIN — MEMANTINE HYDROCHLORIDE SCH MG: 10 TABLET ORAL at 09:06

## 2022-01-23 RX ADMIN — INSULIN ASPART SCH UNIT: 100 INJECTION, SOLUTION INTRAVENOUS; SUBCUTANEOUS at 09:05

## 2022-01-23 RX ADMIN — CLOPIDOGREL BISULFATE SCH MG: 75 TABLET ORAL at 09:07

## 2022-01-23 NOTE — P.PN
Subjective


Progress Note Date: 01/23/22





Pt has no complaints today.  Is stable on room air.





Objective





- Vital Signs


Vital signs: 


                                   Vital Signs











Temp  98.3 F   01/23/22 08:39


 


Pulse  70   01/23/22 08:39


 


Resp  17   01/23/22 08:39


 


BP  153/58   01/23/22 08:39


 


Pulse Ox  94 L  01/23/22 08:39








                                 Intake & Output











 01/22/22 01/23/22 01/23/22





 18:59 06:59 18:59


 


Weight 81.647 kg  


 


Other:   


 


  Voiding Method Diaper Diaper Diaper





   Incontinent


 


  # Voids 3 2 














- Exam





Gen: awake, alert


HEENT: normocephalic, atraumatic, good hearing acuity, moist mucous membranes


Resp: good air exchange, breathing comfortably with no accessory muscle use


CVS: good distal perfusion x 4,


GI: soft, NTTP, ND


: no SPT, no CVAT, rodrigez catheter not present


MSK: no pitting edema, no clubbing


Neuro: non-focal, moving all extremities


Psych: cooperative, euthymic mood





- Labs


CBC & Chem 7: 


                                 01/23/22 06:18





                                 01/23/22 06:18


Labs: 


                  Abnormal Lab Results - Last 24 Hours (Table)











  01/22/22 01/22/22 01/22/22 Range/Units





  08:08 16:31 20:01 


 


WBC     (4.50-10.00)  X 10*3/uL


 


RBC     (4.40-5.60)  X 10*6/uL


 


Hgb     (13.0-17.0)  g/dL


 


Hct     (39.6-50.0)  %


 


MCHC     (32.0-37.0)  g/dL


 


Immature Gran #     (0.00-0.04)  X 10*3/uL


 


Neutrophils #     (1.80-7.70)  X 10*3/uL


 


Lymphocytes #     (0.90-5.00)  X 10*3/uL


 


Monocytes #     (0.20-1.00)  X 10*3/uL


 


Eosinophils #     (0.04-0.35)  X 10*3/uL


 


Fibrinogen     (200-500)  mg/dL


 


D-Dimer     (<0.60)  mg/L FEU


 


Carbon Dioxide  17.5 L    (20.0-27.5)  mmol/L


 


BUN  30.4 H    (9.0-27.0)  mg/dL


 


BUN/Creatinine Ratio  27.64 H    (12.00-20.00)  Ratio


 


Glucose  330 H    ()  mg/dL


 


POC Glucose (mg/dL)   233 H  200 H  (75-99)  mg/dL


 


Calcium  8.1 L    (8.7-10.3)  mg/dL


 


Ferritin  744.0 H    (22.0-322.0)  ng/mL


 


C-Reactive Protein  7.70 H    (0.00-0.80)  mg/dL


 


Total Protein  4.9 L    (6.2-8.2)  g/dL


 


Albumin  2.6 L    (3.8-4.9)  g/dL


 


Albumin/Globulin Ratio  1.13 L    (1.60-3.17)  g/dL














  01/23/22 01/23/22 01/23/22 Range/Units





  05:19 06:18 06:18 


 


WBC   12.05 H   (4.50-10.00)  X 10*3/uL


 


RBC   4.19 L   (4.40-5.60)  X 10*6/uL


 


Hgb   11.6 L   (13.0-17.0)  g/dL


 


Hct   36.5 L   (39.6-50.0)  %


 


MCHC   31.8 L   (32.0-37.0)  g/dL


 


Immature Gran #   0.15 H   (0.00-0.04)  X 10*3/uL


 


Neutrophils #   10.05 H   (1.80-7.70)  X 10*3/uL


 


Lymphocytes #   0.72 L   (0.90-5.00)  X 10*3/uL


 


Monocytes #   1.09 H   (0.20-1.00)  X 10*3/uL


 


Eosinophils #   0.02 L   (0.04-0.35)  X 10*3/uL


 


Fibrinogen     (200-500)  mg/dL


 


D-Dimer     (<0.60)  mg/L FEU


 


Carbon Dioxide    19.0 L  (20.0-27.5)  mmol/L


 


BUN     (9.0-27.0)  mg/dL


 


BUN/Creatinine Ratio    20.13 H  (12.00-20.00)  Ratio


 


Glucose    142 H  ()  mg/dL


 


POC Glucose (mg/dL)  210 H    (75-99)  mg/dL


 


Calcium    7.8 L  (8.7-10.3)  mg/dL


 


Ferritin    575.0 H  (22.0-322.0)  ng/mL


 


C-Reactive Protein    5.10 H  (0.00-0.80)  mg/dL


 


Total Protein    4.9 L  (6.2-8.2)  g/dL


 


Albumin    2.5 L  (3.8-4.9)  g/dL


 


Albumin/Globulin Ratio    1.07 L  (1.60-3.17)  g/dL














  01/23/22 01/23/22 01/23/22 Range/Units





  06:18 08:58 11:15 


 


WBC     (4.50-10.00)  X 10*3/uL


 


RBC     (4.40-5.60)  X 10*6/uL


 


Hgb     (13.0-17.0)  g/dL


 


Hct     (39.6-50.0)  %


 


MCHC     (32.0-37.0)  g/dL


 


Immature Gran #     (0.00-0.04)  X 10*3/uL


 


Neutrophils #     (1.80-7.70)  X 10*3/uL


 


Lymphocytes #     (0.90-5.00)  X 10*3/uL


 


Monocytes #     (0.20-1.00)  X 10*3/uL


 


Eosinophils #     (0.04-0.35)  X 10*3/uL


 


Fibrinogen  656 H    (200-500)  mg/dL


 


D-Dimer  1.01 H    (<0.60)  mg/L FEU


 


Carbon Dioxide     (20.0-27.5)  mmol/L


 


BUN     (9.0-27.0)  mg/dL


 


BUN/Creatinine Ratio     (12.00-20.00)  Ratio


 


Glucose     ()  mg/dL


 


POC Glucose (mg/dL)   193 H  165 H  (75-99)  mg/dL


 


Calcium     (8.7-10.3)  mg/dL


 


Ferritin     (22.0-322.0)  ng/mL


 


C-Reactive Protein     (0.00-0.80)  mg/dL


 


Total Protein     (6.2-8.2)  g/dL


 


Albumin     (3.8-4.9)  g/dL


 


Albumin/Globulin Ratio     (1.60-3.17)  g/dL








                      Microbiology - Last 24 Hours (Table)











 01/20/22 20:25 Blood Culture - Preliminary





 Blood    No Growth after 48 hours














Assessment and Plan


Assessment: 





Acute hypoxemic respiratory failure secondary to Covid 19, improving, now on 

room air


Covid 19


-Admit inpatient, telemetry


-Pulmonary consult


-Oxygen when necessary


-Dexamethasone


-Vitamin C, D, zinc


-Follow inflammatory markers


-PT consult





CAD


Hypertension


Hyperlipidemia


Diabetes type 2


Peripheral vascular disease


Depression


Dementia


-Home medications were reviewed and reconciled


-Correctional insulin








Patient is a full code


DVT prophylaxis with Lovenox subcu

## 2022-01-23 NOTE — P.PN
Subjective


Progress Note Date: 01/23/22


Principal diagnosis: 


 Acute COVID-19 infection without pneumonia





 This 76-year-old white male patient with advanced dementia, who is a very poor 

historian, and prior history of cardiac disease, hyperlipidemia, hypertension, 

previous history of coronary artery bypass grafting surgery, diabetes mellitus, 

hyperlipidemia, former smoker, peripheral vascular disease with prior history of

right iliac stent placement, and depression who was brought into the hospital on

01/20/2022 for evaluation of shortness of breath according to the ED 

documentation.  Patient is very confused, he is not able to tell me what brought

him to the hospital.  However does not appear to be in any acute distress, he 

states his wife brought him in.  He is only oriented 1, he did not know he was 

in the hospital, he is not able to tell me the exact date.  He seems to have 

delayed verbal response to questioning, but he is awake, he is trying to feed 

himself.  He denies being short of breath, he states that he does cough all the 

time.  Denies any chest discomfort.  Is currently on 2 L of oxygen his pulse ox 

is 96%, according to the ED documentation patient was complaining of ongoing 

worsening shortness of breath, fever chills, generalized weakness over the past 

2 weeks, according to the wife patient was diagnosed with COVID-19 on 

01/13/2022, he is non-vaccinated for COVID-19, his breathing became much worse, 

he was very weak and unable to walk, she was not eating well.  Chest x-ray in 

emergency department showed chronic changes without acute pulmonary process.  

COVID-19 PCR was positive, influenza A and B PCR were negative, his white count 

was 11.7, hemoglobin was 13.0, fibrinogen level was 844, d-dimer was 1.5, sodium

is 133, potassium is 4.7, CO2 is 20, BUN is 34 creatinine is 1.35, lactic acid 

was 1.3.  LFTs are within normal limits, LDH was 937, CRP was 20.8, troponin was

less than 0.012, pro calcitonin level was 0.55.  Patient is outside the window 

for Remdesivir, he was started on Decadron 6 mg daily, he is on prophylactic 

Lovenox 40 mg daily, he was given gentle IV hydration with 500 mL IV fluid bolus

in the emergency department, his maintenance IV fluids infusing at rate of 75 ML

per hour.  Blood culture has been sent and is pending.  Patient did have a low-

grade fever overnight with a temp of 100.9F.  He is afebrile this morning, his 

appetite is clean, he is feeding himself.  Lung sounds reveal scattered 

crackles, no rhonchi, no wheezing, patient denies any chest pain, abdomen is 

soft, denies any nausea, no diarrhea.





The patient is seen today 01/22/2022 in follow-up on the regular medical floor. 

He is currently sitting up in bed.  Awake and alert in no acute distress.  He is

somewhat disoriented to time and place.  He does have a history of dementia that

has been on the decline.  The plan is for transfer to subacute rehabilitation on

Monday.  Presently he is 98% O2 saturation on 2 L however he does drop to 84% on

room air.  He is continued on Decadron, Lovenox, vitamin supplements.  Dopplers 

of the lower extremities revealed no evidence of DVT.  White count 17.0.  

Hemoglobin 11.5.  D-dimer 0.73.  Sodium 136.  Potassium 4.9.  Creatinine 1.1.  

Glucose 3:30.  AST 22.  ALT 20.





On the 01/23/2022 patient seen in follow-up on medical surgical floor, he is 

resting comfortably in bed, room air pulse ox is 94%, he denies any difficulty 

breathing, lung sounds do reveal some scattered crackles, no cough, no cognitive

chest discomfort, patient is a poor historian, however he denies any dyspnea, 

denies any cough, denies any pulmonary symptoms, no fever or chills.  Vital 

signs have been stable, breathing is nonlabored, he currently remains on dexamet

hasone 6 mg daily, he is on prophylactic Lovenox, multivitamins.  His lower 

extremity Dopplers were negative for DVT.  Today's labs have been reviewed, his 

white blood cell count is improved and is down to 12.05, hemoglobin is 11.6, d-

dimer is 1.01, electrolytes and renal profile are unremarkable, his ferritin 

level is improved and is down to 575, his LDH level is still pending, CRP is 

improved and is down to 5.10.  His pro calcitonin level was mildly elevated at 

0.55, urinalysis had shown no evidence of infection. 








Objective





- Vital Signs


Vital signs: 


                                   Vital Signs











Temp  98.3 F   01/23/22 12:39


 


Pulse  68   01/23/22 12:39


 


Resp  18   01/23/22 12:39


 


BP  148/75   01/23/22 12:39


 


Pulse Ox  94 L  01/23/22 12:39








                                 Intake & Output











 01/22/22 01/23/22 01/23/22





 18:59 06:59 18:59


 


Weight 81.647 kg  


 


Other:   


 


  Voiding Method Diaper Diaper Diaper





   Incontinent


 


  # Voids 3 2 














- Exam


GENERAL EXAM: Alert, confused 76-year-old patient, only oriented to self, 

resting comfortably, currently on room air with pulse ox of 94%, patient is a 

poor historian related to his underlying history of dementia, but appears 

comfortable in no apparent distress.


HEAD: Normocephalic/atraumatic.


EYES: Normal reaction of pupils, equal size.  Conjunctiva pink, sclera white.


NOSE: Clear with pink turbinates.


THROAT: No erythema or exudates.


NECK: No masses, no JVD, no thyroid enlargement, no adenopathy.


CHEST: No chest wall deformity.  Symmetrical expansion. 


LUNGS: Equal air entry with diffuse scattered crackles


CVS: Regular rate and rhythm, normal S1 and S2, no gallops, no murmurs, no rubs


ABDOMEN: Soft, nontender.  No hepatosplenomegaly, normal bowel sounds, no 

guarding or rigidity.


EXTREMITIES: No clubbing, no edema, no cyanosis, 2+ pulses and upper and lower 

extremities.


MUSCULOSKELETAL: Muscle strength and tone normal.


SPINE: No scoliosis or deformity


SKIN: No rashes


CENTRAL NERVOUS SYSTEM: No focal deficits, tone is normal in all 4 extremities.


PSYCHIATRIC: Alert and oriented -1.  Appropriate affect. 











- Labs


CBC & Chem 7: 


                                 01/23/22 06:18





                                 01/23/22 06:18


Labs: 


                  Abnormal Lab Results - Last 24 Hours (Table)











  01/22/22 01/22/22 01/23/22 Range/Units





  16:31 20:01 05:19 


 


WBC     (4.50-10.00)  X 10*3/uL


 


RBC     (4.40-5.60)  X 10*6/uL


 


Hgb     (13.0-17.0)  g/dL


 


Hct     (39.6-50.0)  %


 


MCHC     (32.0-37.0)  g/dL


 


Immature Gran #     (0.00-0.04)  X 10*3/uL


 


Neutrophils #     (1.80-7.70)  X 10*3/uL


 


Lymphocytes #     (0.90-5.00)  X 10*3/uL


 


Monocytes #     (0.20-1.00)  X 10*3/uL


 


Eosinophils #     (0.04-0.35)  X 10*3/uL


 


Fibrinogen     (200-500)  mg/dL


 


D-Dimer     (<0.60)  mg/L FEU


 


Carbon Dioxide     (20.0-27.5)  mmol/L


 


BUN/Creatinine Ratio     (12.00-20.00)  Ratio


 


Glucose     ()  mg/dL


 


POC Glucose (mg/dL)  233 H  200 H  210 H  (75-99)  mg/dL


 


Calcium     (8.7-10.3)  mg/dL


 


Ferritin     (22.0-322.0)  ng/mL


 


C-Reactive Protein     (0.00-0.80)  mg/dL


 


Total Protein     (6.2-8.2)  g/dL


 


Albumin     (3.8-4.9)  g/dL


 


Albumin/Globulin Ratio     (1.60-3.17)  g/dL














  01/23/22 01/23/22 01/23/22 Range/Units





  06:18 06:18 06:18 


 


WBC  12.05 H    (4.50-10.00)  X 10*3/uL


 


RBC  4.19 L    (4.40-5.60)  X 10*6/uL


 


Hgb  11.6 L    (13.0-17.0)  g/dL


 


Hct  36.5 L    (39.6-50.0)  %


 


MCHC  31.8 L    (32.0-37.0)  g/dL


 


Immature Gran #  0.15 H    (0.00-0.04)  X 10*3/uL


 


Neutrophils #  10.05 H    (1.80-7.70)  X 10*3/uL


 


Lymphocytes #  0.72 L    (0.90-5.00)  X 10*3/uL


 


Monocytes #  1.09 H    (0.20-1.00)  X 10*3/uL


 


Eosinophils #  0.02 L    (0.04-0.35)  X 10*3/uL


 


Fibrinogen    656 H  (200-500)  mg/dL


 


D-Dimer    1.01 H  (<0.60)  mg/L FEU


 


Carbon Dioxide   19.0 L   (20.0-27.5)  mmol/L


 


BUN/Creatinine Ratio   20.13 H   (12.00-20.00)  Ratio


 


Glucose   142 H   ()  mg/dL


 


POC Glucose (mg/dL)     (75-99)  mg/dL


 


Calcium   7.8 L   (8.7-10.3)  mg/dL


 


Ferritin   575.0 H   (22.0-322.0)  ng/mL


 


C-Reactive Protein   5.10 H   (0.00-0.80)  mg/dL


 


Total Protein   4.9 L   (6.2-8.2)  g/dL


 


Albumin   2.5 L   (3.8-4.9)  g/dL


 


Albumin/Globulin Ratio   1.07 L   (1.60-3.17)  g/dL














  01/23/22 01/23/22 Range/Units





  08:58 11:15 


 


WBC    (4.50-10.00)  X 10*3/uL


 


RBC    (4.40-5.60)  X 10*6/uL


 


Hgb    (13.0-17.0)  g/dL


 


Hct    (39.6-50.0)  %


 


MCHC    (32.0-37.0)  g/dL


 


Immature Gran #    (0.00-0.04)  X 10*3/uL


 


Neutrophils #    (1.80-7.70)  X 10*3/uL


 


Lymphocytes #    (0.90-5.00)  X 10*3/uL


 


Monocytes #    (0.20-1.00)  X 10*3/uL


 


Eosinophils #    (0.04-0.35)  X 10*3/uL


 


Fibrinogen    (200-500)  mg/dL


 


D-Dimer    (<0.60)  mg/L FEU


 


Carbon Dioxide    (20.0-27.5)  mmol/L


 


BUN/Creatinine Ratio    (12.00-20.00)  Ratio


 


Glucose    ()  mg/dL


 


POC Glucose (mg/dL)  193 H  165 H  (75-99)  mg/dL


 


Calcium    (8.7-10.3)  mg/dL


 


Ferritin    (22.0-322.0)  ng/mL


 


C-Reactive Protein    (0.00-0.80)  mg/dL


 


Total Protein    (6.2-8.2)  g/dL


 


Albumin    (3.8-4.9)  g/dL


 


Albumin/Globulin Ratio    (1.60-3.17)  g/dL








                      Microbiology - Last 24 Hours (Table)











 01/20/22 20:25 Blood Culture - Preliminary





 Blood    No Growth after 48 hours














Assessment and Plan


Plan: 


 Assessment:





#1.  Acute hypoxic respiratory failure related to acute COVID-19 infection, 

chest x-ray showing no acute pulmonary process.  Patient presented to the 

hospital on 01/20/2022, he tested positive for COVID-19 on 01/13/2022, patient 

is not vaccinated against COVID-19.  He is outside the window for Remdesivir, he

is currently on Decadron, lactic Lovenox, multivitamins, and supportive ac

tment





#2.  Elevated pro-calcitonin level, rule out possibility of bacterial infection,

chest x-ray showing no acute process





#3.  Dehydration, acute kidney injury, patient is receiving gentle IV hydration





#4.  Elevated inflammatory markers related to acute COVID-19 infection





#5.  Hypertension





#6. Coronary artery disease with previous history of bypass grafting





#7.  Diabetes mellitus type 2





#8.  Hyperlipidemia





#9.  History of peripheral vascular disease with previous right iliac stent plac

ement





#10.  History of depression





#11.  Former smoker





#12.  Dementia





Plan:





Patient denies any pulmonary symptoms


No worsening dyspnea or cough


he is on room air maintaining stable O2 saturations


No fever or chills


Today's labs have been noted


Continue prophylactic Lovenox and Decadron


Lower extremity Dopplers were negative for DVT


Inflammatory markers are improving


Increase activity as tolerated


Recommend discharge in the next 24 hours if remains stable continues to improve 

possibly to ECF





I performed a history & physical examination of the patient and discussed their 

management with my nurse practitioner, Xiomara Goetz.  I reviewed the nurse 

practitioner's note and agree with the documented findings and plan of care.  

Lung sounds are positive for diffuse crackles throughout the lung fields.  The 

findings and the impression was discussed with the patient.  I attest to the 

documentation by the nurse practitioner. 








Time with Patient: Less than 30

## 2022-01-24 VITALS
TEMPERATURE: 98 F | DIASTOLIC BLOOD PRESSURE: 75 MMHG | HEART RATE: 67 BPM | RESPIRATION RATE: 17 BRPM | SYSTOLIC BLOOD PRESSURE: 164 MMHG

## 2022-01-24 LAB
GLUCOSE BLD-MCNC: 151 MG/DL (ref 75–99)
GLUCOSE BLD-MCNC: 178 MG/DL (ref 75–99)
GLUCOSE BLD-MCNC: 180 MG/DL (ref 75–99)

## 2022-01-24 RX ADMIN — INSULIN DETEMIR SCH UNIT: 100 INJECTION, SOLUTION SUBCUTANEOUS at 09:08

## 2022-01-24 RX ADMIN — INSULIN ASPART SCH UNIT: 100 INJECTION, SOLUTION INTRAVENOUS; SUBCUTANEOUS at 09:08

## 2022-01-24 RX ADMIN — INSULIN ASPART SCH UNIT: 100 INJECTION, SOLUTION INTRAVENOUS; SUBCUTANEOUS at 12:14

## 2022-01-24 RX ADMIN — ASPIRIN 81 MG CHEWABLE TABLET SCH MG: 81 TABLET CHEWABLE at 09:10

## 2022-01-24 RX ADMIN — CLOPIDOGREL BISULFATE SCH MG: 75 TABLET ORAL at 09:10

## 2022-01-24 RX ADMIN — OXYCODONE HYDROCHLORIDE AND ACETAMINOPHEN SCH MG: 500 TABLET ORAL at 09:10

## 2022-01-24 RX ADMIN — CHOLECALCIFEROL TAB 125 MCG (5000 UNIT) SCH MCG: 125 TAB at 09:09

## 2022-01-24 RX ADMIN — ENOXAPARIN SODIUM SCH MG: 40 INJECTION SUBCUTANEOUS at 01:17

## 2022-01-24 RX ADMIN — Medication SCH MG: at 09:10

## 2022-01-24 RX ADMIN — DONEPEZIL HYDROCHLORIDE SCH MG: 5 TABLET ORAL at 09:09

## 2022-01-24 RX ADMIN — MEMANTINE HYDROCHLORIDE SCH MG: 10 TABLET ORAL at 09:10

## 2022-01-24 NOTE — P.DS
Providers


Date of admission: 


01/21/22 02:30





Expected date of discharge: 01/24/22


Attending physician: 


Dani Bennett MD





Consults: 





                                        





01/20/22 22:34


Consult Physician Urgent 


   Consulting Provider: Neftaly Lomeli


   Consult Reason/Comments: COVID-19 with hypoxemia


   Do you want consulting provider notified?: Yes











Primary care physician: 


[facility] Course: 





Acute hypoxemic respiratory failure secondary to Covid 19, improving, now on 

room air


Covid 19


-Admitted inpatient, telemetry.  Pulmonary consulted for management. CXR 

demonstrating chronic changes without obvious evidence of COVID.  Oxygen was 

provided when necessary, but pt improve back to room air.  He was treated with 

dexamethasone, Vit C/D, Zinc.  PT consult recommended SNF for rehab on discha

rge.  Pt will f/u with PCP.








CAD


Hypertension


Hyperlipidemia


Diabetes type 2


Peripheral vascular disease


Depression


Dementia


-Home medications were reviewed and reconciled, no changes made on discharge, 

but dexamethasone and Vit C/D, Zinc were prescribed for COVID








I spent 40 minutes coordinating this complex discharge.


Assessment: 


Gen: awake, alert


HEENT: normocephalic, atraumatic, good hearing acuity, moist mucous membranes


Resp: good air exchange, breathing comfortably with no accessory muscle use


CVS: good distal perfusion x 4,


GI: soft, NTTP, ND


: no SPT, no CVAT, rodrigez catheter not present


MSK: no pitting edema, no clubbing


Neuro: non-focal, moving all extremities


Psych: cooperative, euthymic mood


Patient Condition at Discharge: Good





Plan - Discharge Summary


Discharge Rx Participant: No


New Discharge Prescriptions: 


New


   dexAMETHasone ORAL [Hexadrol] 6 mg PO DAILY  tab


   Zinc Sulfate [Orazinc] 220 mg PO DAILY  cap


   Acetaminophen Tab [Tylenol] 650 mg PO Q4HR PRN  tab


     PRN Reason: Fever>101


   Ascorbic Acid [Vitamin C] 1,000 mg PO DAILY  tab


   Cholecalciferol [Vitamin D3 (125 Mcg = 5000 Iu)] 125 mcg PO DAILY  tablet





Continue


   Clopidogrel [Plavix] 75 mg PO DAILY  tab


   Nitroglycerin Sl Tabs [Nitrostat] 0.4 mg SUBLINGUAL Q5M PRN  tab


     PRN Reason: Chest Pain


   DULoxetine HCL [Cymbalta] 30 mg PO DAILY


   amLODIPine [Norvasc] 5 mg PO DAILY


   Memantine [Namenda] 10 mg PO BID


   Aspirin EC [Ecotrin Low Dose] 81 mg PO DAILY


   Insulin Glargine [Lantus Vial] 26 unit SQ AC-SUPPER


   Insulin Glargine [Lantus Vial] 32 unit SQ AC-BRKFST


   Donepezil [Aricept] 5 mg PO DAILY


   Atorvastatin [Lipitor] 40 mg PO HS


   Dulaglutide [Trulicity] 0.75 mg SQ SA


   QUEtiapine [SEROquel] 25 mg PO HS


   Omeprazole 20 mg PO DAILY


   Lisinopril [Prinivil] 10 mg PO DAILY


   carvediloL [Coreg*] 12.5 mg PO DAILY


Discharge Medication List





Clopidogrel [Plavix] 75 mg PO DAILY  tab 02/18/19 [Rx]


Nitroglycerin Sl Tabs [Nitrostat] 0.4 mg SUBLINGUAL Q5M PRN  tab 02/18/19 [Rx]


DULoxetine HCL [Cymbalta] 30 mg PO DAILY 04/04/19 [History]


amLODIPine [Norvasc] 5 mg PO DAILY 04/04/19 [History]


Aspirin EC [Ecotrin Low Dose] 81 mg PO DAILY 01/20/22 [History]


Atorvastatin [Lipitor] 40 mg PO HS 01/20/22 [History]


Donepezil [Aricept] 5 mg PO DAILY 01/20/22 [History]


Dulaglutide [Trulicity] 0.75 mg SQ SA 01/20/22 [History]


Insulin Glargine [Lantus Vial] 26 unit SQ AC-SUPPER 01/20/22 [History]


Insulin Glargine [Lantus Vial] 32 unit SQ AC-BRKFST 01/20/22 [History]


Lisinopril [Prinivil] 10 mg PO DAILY 01/20/22 [History]


Memantine [Namenda] 10 mg PO BID 01/20/22 [History]


Omeprazole 20 mg PO DAILY 01/20/22 [History]


QUEtiapine [SEROquel] 25 mg PO HS 01/20/22 [History]


carvediloL [Coreg*] 12.5 mg PO DAILY 01/20/22 [History]


Acetaminophen Tab [Tylenol] 650 mg PO Q4HR PRN  tab 01/24/22 [Rx]


Ascorbic Acid [Vitamin C] 1,000 mg PO DAILY  tab 01/24/22 [Rx]


Cholecalciferol [Vitamin D3 (125 Mcg = 5000 Iu)] 125 mcg PO DAILY  tablet 

01/24/22 [Rx]


Zinc Sulfate [Orazinc] 220 mg PO DAILY  cap 01/24/22 [Rx]


dexAMETHasone ORAL [Hexadrol] 6 mg PO DAILY  tab 01/24/22 [Rx]








Follow up Appointment(s)/Referral(s): 


Shaggy Adames MD [Primary Care Provider] - 1-2 days


Patient Instructions/Handouts:  Coronavirus Disease 2019 (COVID-19)


Discharge Disposition: TRANSFER TO SNF/ECF
